# Patient Record
Sex: FEMALE | Race: WHITE | NOT HISPANIC OR LATINO | Employment: FULL TIME | URBAN - METROPOLITAN AREA
[De-identification: names, ages, dates, MRNs, and addresses within clinical notes are randomized per-mention and may not be internally consistent; named-entity substitution may affect disease eponyms.]

---

## 2023-03-06 ENCOUNTER — OFFICE VISIT (OUTPATIENT)
Dept: PRIMARY CARE | Facility: CLINIC | Age: 58
End: 2023-03-06
Payer: COMMERCIAL

## 2023-03-06 VITALS
RESPIRATION RATE: 16 BRPM | SYSTOLIC BLOOD PRESSURE: 122 MMHG | WEIGHT: 150 LBS | BODY MASS INDEX: 24.99 KG/M2 | DIASTOLIC BLOOD PRESSURE: 74 MMHG | HEART RATE: 76 BPM | TEMPERATURE: 98.4 F | HEIGHT: 65 IN

## 2023-03-06 DIAGNOSIS — R05.1 ACUTE COUGH: Primary | ICD-10-CM

## 2023-03-06 PROCEDURE — 4004F PT TOBACCO SCREEN RCVD TLK: CPT | Performed by: INTERNAL MEDICINE

## 2023-03-06 PROCEDURE — 99213 OFFICE O/P EST LOW 20 MIN: CPT | Performed by: INTERNAL MEDICINE

## 2023-03-06 RX ORDER — ALPRAZOLAM 0.25 MG/1
0.25 TABLET ORAL NIGHTLY PRN
COMMUNITY
End: 2023-06-16 | Stop reason: SDUPTHER

## 2023-03-06 RX ORDER — PREDNISONE 20 MG/1
TABLET ORAL
Qty: 21 TABLET | Refills: 0 | Status: SHIPPED | OUTPATIENT
Start: 2023-03-06 | End: 2023-08-02 | Stop reason: ALTCHOICE

## 2023-03-06 RX ORDER — CEFDINIR 300 MG/1
300 CAPSULE ORAL 2 TIMES DAILY
Qty: 20 CAPSULE | Refills: 0 | Status: SHIPPED | OUTPATIENT
Start: 2023-03-06 | End: 2023-03-16

## 2023-03-06 ASSESSMENT — ENCOUNTER SYMPTOMS
DIAPHORESIS: 0
DIZZINESS: 0
ARTHRALGIAS: 0
SORE THROAT: 0
APPETITE CHANGE: 0
EYE DISCHARGE: 0
SPEECH DIFFICULTY: 0
SINUS PRESSURE: 1
CHEST TIGHTNESS: 0
HEADACHES: 0
ABDOMINAL PAIN: 0
CHILLS: 0
DIARRHEA: 0
FREQUENCY: 0
JOINT SWELLING: 0
SINUS PAIN: 1
NAUSEA: 0
PALPITATIONS: 0
WEAKNESS: 0
FATIGUE: 0
FEVER: 0
CONSTIPATION: 0
SHORTNESS OF BREATH: 0
COUGH: 1
VOMITING: 0
WHEEZING: 0
DIFFICULTY URINATING: 0

## 2023-03-06 NOTE — PROGRESS NOTES
"Subjective   Kathe Amado is a 57 y.o. female who presents for Sinusitis, Cough, and URI (Patient states symptoms started 3 weeks ago. Cough, post drainage, fever last night 102.0 ).  Today she is accompanied by alone.     Sinusitis  Associated symptoms include coughing and sinus pressure. Pertinent negatives include no chills, congestion, diaphoresis, ear pain, headaches, shortness of breath or sore throat.   Cough  Pertinent negatives include no chest pain, chills, ear pain, fever, headaches, rash, sore throat, shortness of breath or wheezing.   URI   Associated symptoms include coughing and sinus pain. Pertinent negatives include no abdominal pain, chest pain, congestion, diarrhea, ear pain, headaches, nausea, rash, sore throat, vomiting or wheezing.     Woke up yesterday with a cough and congestion.  Fever of 100.2 and now better.    Review of Systems   Constitutional:  Negative for appetite change, chills, diaphoresis, fatigue and fever.   HENT:  Positive for sinus pressure and sinus pain. Negative for congestion, ear discharge, ear pain and sore throat.    Eyes:  Negative for discharge.   Respiratory:  Positive for cough. Negative for chest tightness, shortness of breath and wheezing.    Cardiovascular:  Negative for chest pain, palpitations and leg swelling.   Gastrointestinal:  Negative for abdominal pain, constipation, diarrhea, nausea and vomiting.   Endocrine: Negative for cold intolerance, heat intolerance and polyuria.   Genitourinary:  Negative for difficulty urinating and frequency.   Musculoskeletal:  Negative for arthralgias and joint swelling.   Skin:  Negative for rash.   Neurological:  Negative for dizziness, speech difficulty, weakness and headaches.        Objective   /74   Pulse 76   Temp 36.9 °C (98.4 °F)   Resp 16   Ht 1.651 m (5' 5\")   Wt 68 kg (150 lb)   BMI 24.96 kg/m²   BSA: 1.77 meters squared  Growth percentiles: Facility age limit for growth %dorina is 20 years. " Facility age limit for growth %dorina is 20 years.     Physical Exam  Constitutional:       General: She is not in acute distress.     Appearance: Normal appearance. She is normal weight. She is not toxic-appearing.   HENT:      Right Ear: Tympanic membrane, ear canal and external ear normal.      Left Ear: Tympanic membrane, ear canal and external ear normal.      Mouth/Throat:      Mouth: Mucous membranes are moist.      Pharynx: Oropharynx is clear. No oropharyngeal exudate or posterior oropharyngeal erythema.   Cardiovascular:      Rate and Rhythm: Bradycardia present. Rhythm irregular.   Pulmonary:      Effort: No respiratory distress.      Breath sounds: No wheezing or rales.   Musculoskeletal:      Cervical back: No rigidity or tenderness.   Lymphadenopathy:      Cervical: No cervical adenopathy.   Neurological:      Mental Status: She is alert.         Assessment/Plan   Problem List Items Addressed This Visit    None      Nando Olivas DO

## 2023-03-07 PROCEDURE — U0005 INFEC AGEN DETEC AMPLI PROBE: HCPCS

## 2023-03-07 PROCEDURE — 87635 SARS-COV-2 COVID-19 AMP PRB: CPT

## 2023-03-08 LAB — SARS-COV-2 RESULT: NOT DETECTED

## 2023-06-13 ENCOUNTER — TELEPHONE (OUTPATIENT)
Dept: PRIMARY CARE | Facility: CLINIC | Age: 58
End: 2023-06-13
Payer: COMMERCIAL

## 2023-06-16 DIAGNOSIS — F41.9 ANXIETY: ICD-10-CM

## 2023-06-16 RX ORDER — ALPRAZOLAM 0.25 MG/1
0.25 TABLET ORAL NIGHTLY PRN
Qty: 7 TABLET | Refills: 2 | Status: SHIPPED | OUTPATIENT
Start: 2023-06-16 | End: 2024-03-27 | Stop reason: SDUPTHER

## 2023-08-02 ENCOUNTER — OFFICE VISIT (OUTPATIENT)
Dept: PRIMARY CARE | Facility: CLINIC | Age: 58
End: 2023-08-02
Payer: COMMERCIAL

## 2023-08-02 VITALS
SYSTOLIC BLOOD PRESSURE: 130 MMHG | TEMPERATURE: 98.4 F | DIASTOLIC BLOOD PRESSURE: 68 MMHG | HEIGHT: 65 IN | BODY MASS INDEX: 24.03 KG/M2 | RESPIRATION RATE: 16 BRPM | WEIGHT: 144.2 LBS

## 2023-08-02 DIAGNOSIS — R35.0 URINE FREQUENCY: ICD-10-CM

## 2023-08-02 DIAGNOSIS — N30.00 ACUTE CYSTITIS WITHOUT HEMATURIA: Primary | ICD-10-CM

## 2023-08-02 DIAGNOSIS — Z00.00 WELL ADULT HEALTH CHECK: ICD-10-CM

## 2023-08-02 DIAGNOSIS — Z12.31 ENCOUNTER FOR SCREENING MAMMOGRAM FOR MALIGNANT NEOPLASM OF BREAST: ICD-10-CM

## 2023-08-02 DIAGNOSIS — F51.01 PRIMARY INSOMNIA: ICD-10-CM

## 2023-08-02 PROBLEM — F41.8 MIXED ANXIETY AND DEPRESSIVE DISORDER: Status: ACTIVE | Noted: 2023-08-02

## 2023-08-02 PROBLEM — E55.9 VITAMIN D INSUFFICIENCY: Status: ACTIVE | Noted: 2023-08-02

## 2023-08-02 PROBLEM — R00.2 PALPITATIONS: Status: ACTIVE | Noted: 2023-08-02

## 2023-08-02 PROBLEM — I49.3 PVC (PREMATURE VENTRICULAR CONTRACTION): Status: ACTIVE | Noted: 2023-08-02

## 2023-08-02 PROBLEM — M79.18 MYOFASCIAL PAIN SYNDROME: Status: ACTIVE | Noted: 2023-08-02

## 2023-08-02 PROBLEM — J02.9 PHARYNGITIS: Status: ACTIVE | Noted: 2023-08-02

## 2023-08-02 PROBLEM — K21.9 GERD (GASTROESOPHAGEAL REFLUX DISEASE): Status: ACTIVE | Noted: 2019-10-08

## 2023-08-02 PROBLEM — H90.3 BILATERAL SENSORINEURAL HEARING LOSS: Status: ACTIVE | Noted: 2023-08-02

## 2023-08-02 PROBLEM — E78.2 HYPERLIPIDEMIA, MIXED: Status: ACTIVE | Noted: 2023-08-02

## 2023-08-02 PROBLEM — U07.1 COVID-19 VIRUS INFECTION: Status: ACTIVE | Noted: 2023-08-02

## 2023-08-02 PROBLEM — E53.8 VITAMIN B12 DEFICIENCY: Status: ACTIVE | Noted: 2023-08-02

## 2023-08-02 PROCEDURE — 4004F PT TOBACCO SCREEN RCVD TLK: CPT | Performed by: INTERNAL MEDICINE

## 2023-08-02 PROCEDURE — 99214 OFFICE O/P EST MOD 30 MIN: CPT | Performed by: INTERNAL MEDICINE

## 2023-08-02 RX ORDER — TRAZODONE HYDROCHLORIDE 50 MG/1
50 TABLET ORAL NIGHTLY PRN
Qty: 30 TABLET | Refills: 2 | Status: SHIPPED | OUTPATIENT
Start: 2023-08-02 | End: 2024-03-27 | Stop reason: ALTCHOICE

## 2023-08-02 RX ORDER — CIPROFLOXACIN 250 MG/1
250 TABLET, FILM COATED ORAL 2 TIMES DAILY
Qty: 6 TABLET | Refills: 0 | Status: SHIPPED | OUTPATIENT
Start: 2023-08-02 | End: 2023-08-05

## 2023-08-02 ASSESSMENT — PAIN SCALES - GENERAL: PAINLEVEL: 4

## 2023-08-02 ASSESSMENT — ENCOUNTER SYMPTOMS
FLANK PAIN: 1
FREQUENCY: 1

## 2023-08-02 NOTE — PROGRESS NOTES
"Subjective   Kathe Amado is a 58 y.o. female who presents for UTI.      Patient states that she has been feeling very fatigue  Pain start in abdomen and has moved to upper thighs and lower back on the left.  Right lower quadrant pain on the right.  Feels like she has some relief from cramping and wants to get it checked.  Patient states that she has urine frequency and when she does go she will get a sign a relief for about 5 minutes and then the discomfort comes back     Has not felt good recently and feels tired a lot.  Getting dizzy as well but thinks her sinuses are acting up.  Feels light headed and has been busy seeing mom in rehab.    Does not sleep well.  Falls asleep and sleeps well until midnight.  Up every hour and does not feel rested.   Had been using Xanax for sleep.  Has tried Melatonin but gives her nightmare.    No naps regularly during the day.    Reading before bed helps a little.  Stopped watching TV before bed but will watch up to bedtime.      Plays games on the tablet and that helps.     UTI   This is a new problem. The current episode started 1 to 4 weeks ago. The problem occurs every urination. The problem has been gradually worsening. The quality of the pain is described as aching. The pain is at a severity of 1/10. There has been no fever. There is No history of pyelonephritis. Associated symptoms include flank pain, frequency and urgency. She has tried nothing for the symptoms.       Review of Systems   Genitourinary:  Positive for flank pain, frequency and urgency.   All other systems reviewed and are negative.      Objective   /68 (BP Location: Right arm)   Temp 36.9 °C (98.4 °F)   Resp 16   Ht 1.651 m (5' 5\")   Wt 65.4 kg (144 lb 3.2 oz)   BMI 24.00 kg/m²       Physical Exam  Constitutional:       Appearance: Normal appearance.   HENT:      Head: Normocephalic.   Pulmonary:      Effort: Pulmonary effort is normal.   Musculoskeletal:      Cervical back: Neck supple.    "   Right lower leg: No edema.      Left lower leg: No edema.   Skin:     General: Skin is warm and dry.   Psychiatric:         Mood and Affect: Mood normal.         Assessment/Plan   Problem List Items Addressed This Visit       Primary insomnia     Discussed treatment options available, reviewed risks and benefits of the medication prescribed, and all questions were answered.  Trazodone recommended to help with sleep         Relevant Medications    traZODone (Desyrel) 50 mg tablet    Other Relevant Orders    Follow Up In [facility] - PCP - Established    CBC (Completed)    Acute cystitis without hematuria - Primary     Recurrent problem.  Treat with Cipro x 3 days.         Relevant Orders    CBC (Completed)     Other Visit Diagnoses       Urine frequency        Relevant Medications    traZODone (Desyrel) 50 mg tablet    Other Relevant Orders    POCT UA (nonautomated) manually resulted    CBC (Completed)    Well adult health check        Relevant Orders    TSH with reflex to Free T4 if abnormal (Completed)    Comprehensive Metabolic Panel (Completed)    CBC (Completed)    BI mammo bilateral screening tomosynthesis          Encounter Diagnoses   Name Primary?    Urine frequency     Acute cystitis without hematuria Yes    Primary insomnia     Well adult health check      Nando Olivas, DO

## 2023-08-07 ENCOUNTER — TELEPHONE (OUTPATIENT)
Dept: PRIMARY CARE | Facility: CLINIC | Age: 58
End: 2023-08-07
Payer: COMMERCIAL

## 2023-08-07 NOTE — TELEPHONE ENCOUNTER
Patient called because she came in last week for a UTI. She said she finished the antibiotics she was prescribed and her symptoms are the exact same. She wasn't sure what to do from here.

## 2023-08-17 ENCOUNTER — LAB (OUTPATIENT)
Dept: LAB | Facility: LAB | Age: 58
End: 2023-08-17
Payer: COMMERCIAL

## 2023-08-17 ENCOUNTER — OFFICE VISIT (OUTPATIENT)
Dept: PRIMARY CARE | Facility: CLINIC | Age: 58
End: 2023-08-17
Payer: COMMERCIAL

## 2023-08-17 VITALS
WEIGHT: 145 LBS | SYSTOLIC BLOOD PRESSURE: 113 MMHG | BODY MASS INDEX: 24.16 KG/M2 | DIASTOLIC BLOOD PRESSURE: 76 MMHG | HEART RATE: 81 BPM | HEIGHT: 65 IN | RESPIRATION RATE: 16 BRPM | TEMPERATURE: 97.6 F

## 2023-08-17 DIAGNOSIS — R35.0 URINE FREQUENCY: ICD-10-CM

## 2023-08-17 DIAGNOSIS — F51.01 PRIMARY INSOMNIA: ICD-10-CM

## 2023-08-17 DIAGNOSIS — Z00.00 WELL ADULT HEALTH CHECK: ICD-10-CM

## 2023-08-17 DIAGNOSIS — R31.9 URINARY TRACT INFECTION WITH HEMATURIA, SITE UNSPECIFIED: ICD-10-CM

## 2023-08-17 DIAGNOSIS — N30.00 ACUTE CYSTITIS WITHOUT HEMATURIA: ICD-10-CM

## 2023-08-17 DIAGNOSIS — N39.0 URINARY TRACT INFECTION WITH HEMATURIA, SITE UNSPECIFIED: ICD-10-CM

## 2023-08-17 DIAGNOSIS — F51.01 PRIMARY INSOMNIA: Primary | ICD-10-CM

## 2023-08-17 DIAGNOSIS — F41.8 MIXED ANXIETY AND DEPRESSIVE DISORDER: ICD-10-CM

## 2023-08-17 LAB
ALANINE AMINOTRANSFERASE (SGPT) (U/L) IN SER/PLAS: 11 U/L (ref 7–45)
ALBUMIN (G/DL) IN SER/PLAS: 4.5 G/DL (ref 3.4–5)
ALKALINE PHOSPHATASE (U/L) IN SER/PLAS: 66 U/L (ref 33–110)
ANION GAP IN SER/PLAS: 14 MMOL/L (ref 10–20)
ASPARTATE AMINOTRANSFERASE (SGOT) (U/L) IN SER/PLAS: 13 U/L (ref 9–39)
BILIRUBIN TOTAL (MG/DL) IN SER/PLAS: 0.3 MG/DL (ref 0–1.2)
CALCIUM (MG/DL) IN SER/PLAS: 9.2 MG/DL (ref 8.6–10.3)
CARBON DIOXIDE, TOTAL (MMOL/L) IN SER/PLAS: 26 MMOL/L (ref 21–32)
CHLORIDE (MMOL/L) IN SER/PLAS: 104 MMOL/L (ref 98–107)
CREATININE (MG/DL) IN SER/PLAS: 0.76 MG/DL (ref 0.5–1.05)
ERYTHROCYTE DISTRIBUTION WIDTH (RATIO) BY AUTOMATED COUNT: 12.6 % (ref 11.5–14.5)
ERYTHROCYTE MEAN CORPUSCULAR HEMOGLOBIN CONCENTRATION (G/DL) BY AUTOMATED: 32.3 G/DL (ref 32–36)
ERYTHROCYTE MEAN CORPUSCULAR VOLUME (FL) BY AUTOMATED COUNT: 90 FL (ref 80–100)
ERYTHROCYTES (10*6/UL) IN BLOOD BY AUTOMATED COUNT: 4.73 X10E12/L (ref 4–5.2)
GFR FEMALE: >90 ML/MIN/1.73M2
GLUCOSE (MG/DL) IN SER/PLAS: 99 MG/DL (ref 74–99)
HEMATOCRIT (%) IN BLOOD BY AUTOMATED COUNT: 42.7 % (ref 36–46)
HEMOGLOBIN (G/DL) IN BLOOD: 13.8 G/DL (ref 12–16)
LEUKOCYTES (10*3/UL) IN BLOOD BY AUTOMATED COUNT: 9.7 X10E9/L (ref 4.4–11.3)
PLATELETS (10*3/UL) IN BLOOD AUTOMATED COUNT: 273 X10E9/L (ref 150–450)
POC APPEARANCE, URINE: ABNORMAL
POC BILIRUBIN, URINE: NEGATIVE
POC BLOOD, URINE: ABNORMAL
POC COLOR, URINE: YELLOW
POC GLUCOSE, URINE: NEGATIVE MG/DL
POC KETONES, URINE: NEGATIVE MG/DL
POC LEUKOCYTES, URINE: ABNORMAL
POC NITRITE,URINE: NEGATIVE
POC PH, URINE: 5 PH
POC PROTEIN, URINE: NEGATIVE MG/DL
POC SPECIFIC GRAVITY, URINE: 1.01
POC UROBILINOGEN, URINE: 0.2 EU/DL
POTASSIUM (MMOL/L) IN SER/PLAS: 4.2 MMOL/L (ref 3.5–5.3)
PROTEIN TOTAL: 6.6 G/DL (ref 6.4–8.2)
SODIUM (MMOL/L) IN SER/PLAS: 140 MMOL/L (ref 136–145)
THYROTROPIN (MIU/L) IN SER/PLAS BY DETECTION LIMIT <= 0.05 MIU/L: 1.84 MIU/L (ref 0.44–3.98)
UREA NITROGEN (MG/DL) IN SER/PLAS: 21 MG/DL (ref 6–23)

## 2023-08-17 PROCEDURE — 4004F PT TOBACCO SCREEN RCVD TLK: CPT | Performed by: INTERNAL MEDICINE

## 2023-08-17 PROCEDURE — 87086 URINE CULTURE/COLONY COUNT: CPT

## 2023-08-17 PROCEDURE — 84443 ASSAY THYROID STIM HORMONE: CPT

## 2023-08-17 PROCEDURE — 99213 OFFICE O/P EST LOW 20 MIN: CPT | Performed by: INTERNAL MEDICINE

## 2023-08-17 PROCEDURE — 80053 COMPREHEN METABOLIC PANEL: CPT

## 2023-08-17 PROCEDURE — 85027 COMPLETE CBC AUTOMATED: CPT

## 2023-08-17 PROCEDURE — 81002 URINALYSIS NONAUTO W/O SCOPE: CPT | Performed by: INTERNAL MEDICINE

## 2023-08-17 PROCEDURE — 36415 COLL VENOUS BLD VENIPUNCTURE: CPT

## 2023-08-17 RX ORDER — CIPROFLOXACIN 250 MG/1
250 TABLET, FILM COATED ORAL 2 TIMES DAILY
Qty: 14 TABLET | Refills: 0 | Status: SHIPPED | OUTPATIENT
Start: 2023-08-17 | End: 2023-08-24

## 2023-08-17 ASSESSMENT — ENCOUNTER SYMPTOMS
ABDOMINAL PAIN: 0
FEVER: 0
SHORTNESS OF BREATH: 0

## 2023-08-17 NOTE — PROGRESS NOTES
"Subjective   Kathe Amado is a 57 y.o. female who presents for UTI (New onset of burning ).      Still getting cramping lower admen and urinating more frequently.  Feels burning lately as well.     Sleeping better and taking it when she gets in bed..  Before that took it an hour before but felt anxious.  Feels better rested and does not feel groggy.         Review of Systems   Constitutional:  Negative for fever.   Respiratory:  Negative for shortness of breath.    Cardiovascular:  Negative for chest pain.   Gastrointestinal:  Negative for abdominal pain.   Skin:  Negative for rash.       Objective   /76   Pulse 81   Temp 36.4 °C (97.6 °F)   Resp 16   Ht 1.651 m (5' 5\")   Wt 65.8 kg (145 lb)   BMI 24.13 kg/m²       Physical Exam    Assessment/Plan   Problem List Items Addressed This Visit       Mixed anxiety and depressive disorder    Primary insomnia - Primary    Urinary tract infection with hematuria    Relevant Medications    ciprofloxacin (Cipro) 250 mg tablet    Other Relevant Orders    POCT UA (nonautomated) manually resulted (Completed)     Encounter Diagnoses   Name Primary?    Urinary tract infection with hematuria, site unspecified     Primary insomnia Yes    Mixed anxiety and depressive disorder      Nando Olivas, DO     "

## 2023-08-19 LAB — URINE CULTURE: NORMAL

## 2023-08-23 PROBLEM — N30.00 ACUTE CYSTITIS WITHOUT HEMATURIA: Status: ACTIVE | Noted: 2023-08-23

## 2023-08-23 NOTE — ASSESSMENT & PLAN NOTE
Discussed treatment options available, reviewed risks and benefits of the medication prescribed, and all questions were answered.  Trazodone recommended to help with sleep

## 2023-08-29 ENCOUNTER — OFFICE VISIT (OUTPATIENT)
Dept: PRIMARY CARE | Facility: CLINIC | Age: 58
End: 2023-08-29
Payer: COMMERCIAL

## 2023-08-29 VITALS
HEIGHT: 65 IN | WEIGHT: 144.4 LBS | HEART RATE: 75 BPM | TEMPERATURE: 98.2 F | BODY MASS INDEX: 24.06 KG/M2 | OXYGEN SATURATION: 95 % | SYSTOLIC BLOOD PRESSURE: 109 MMHG | RESPIRATION RATE: 16 BRPM | DIASTOLIC BLOOD PRESSURE: 72 MMHG

## 2023-08-29 DIAGNOSIS — R35.0 URINE FREQUENCY: ICD-10-CM

## 2023-08-29 DIAGNOSIS — R10.31 RLQ ABDOMINAL PAIN: Primary | ICD-10-CM

## 2023-08-29 LAB
POC APPEARANCE, URINE: CLEAR
POC BILIRUBIN, URINE: NEGATIVE
POC BLOOD, URINE: ABNORMAL
POC COLOR, URINE: YELLOW
POC GLUCOSE, URINE: NEGATIVE MG/DL
POC KETONES, URINE: NEGATIVE MG/DL
POC LEUKOCYTES, URINE: NEGATIVE
POC NITRITE,URINE: NEGATIVE
POC PH, URINE: 6 PH
POC PROTEIN, URINE: ABNORMAL MG/DL
POC SPECIFIC GRAVITY, URINE: >=1.03
POC UROBILINOGEN, URINE: 0.2 EU/DL

## 2023-08-29 PROCEDURE — 81002 URINALYSIS NONAUTO W/O SCOPE: CPT | Performed by: INTERNAL MEDICINE

## 2023-08-29 PROCEDURE — 99213 OFFICE O/P EST LOW 20 MIN: CPT | Performed by: INTERNAL MEDICINE

## 2023-08-29 PROCEDURE — 4004F PT TOBACCO SCREEN RCVD TLK: CPT | Performed by: INTERNAL MEDICINE

## 2023-08-29 ASSESSMENT — ENCOUNTER SYMPTOMS
FREQUENCY: 1
NAUSEA: 1
FLANK PAIN: 1
SWEATS: 1

## 2023-08-29 NOTE — PROGRESS NOTES
"Subjective   Kathe Amado is a 58 y.o. female who presents for UTI (/) and Follow-up.      Patient has taken 2 rounds of antibiotics and states that she is not feeling any better   Patient states that she is constantly is feeling like she has to urinate   Having pain/pressure lower right abdomen that wraps around to lower back- wakes her up at night   Patient states that her urine is still very yellow  No burning   Patient does state that she has been sweating at night but not sure if its due to the trazodone   Patient states that years ago during a scan they found that she had calcium build up on her kidneys but has not been checked since    Feels like she needs to go to the bathroom all the time.  Still with RLQ discomfort and it radiatess around to her back and needs to go to the bathroom a lot.     UTI   This is a recurrent problem. The current episode started 1 to 4 weeks ago. The problem has been unchanged. There has been no fever. Associated symptoms include flank pain, frequency, nausea and sweats. She has tried antibiotics for the symptoms. The treatment provided mild relief. Her past medical history is significant for recurrent UTIs.       Review of Systems   Gastrointestinal:  Positive for nausea.   Genitourinary:  Positive for flank pain and frequency.   All other systems reviewed and are negative.      Objective   /72   Pulse 75   Temp 36.8 °C (98.2 °F)   Resp 16   Ht 1.651 m (5' 5\")   Wt 65.5 kg (144 lb 6.4 oz)   SpO2 95%   BMI 24.03 kg/m²       Physical Exam  Constitutional:       Appearance: Normal appearance.   HENT:      Head: Normocephalic.   Eyes:      Conjunctiva/sclera: Conjunctivae normal.   Cardiovascular:      Rate and Rhythm: Normal rate and regular rhythm.   Pulmonary:      Effort: Pulmonary effort is normal.      Breath sounds: Normal breath sounds.   Abdominal:      General: Abdomen is flat.      Palpations: Abdomen is soft.      Tenderness: There is abdominal " tenderness in the right lower quadrant. Positive signs include psoas sign. Negative signs include obturator sign.      Comments: RLQ pain without rebound or guarding but persistent and without resolution.     Musculoskeletal:      Cervical back: Neck supple.   Skin:     General: Skin is warm and dry.   Neurological:      Mental Status: She is alert.         Assessment/Plan   Problem List Items Addressed This Visit       RLQ abdominal pain - Primary     RLQ pain without rebound or guarding but persistent and without resolution.  Will check CT abdomen and pelvis to rule out chronic appendicitis.          Relevant Orders    CT abdomen pelvis wo IV contrast     Other Visit Diagnoses       Urine frequency        Relevant Orders    POCT UA (nonautomated) manually resulted (Completed)          Encounter Diagnoses   Name Primary?    Urine frequency     RLQ abdominal pain Yes     Nando Olivas, DO

## 2023-08-29 NOTE — ASSESSMENT & PLAN NOTE
RLQ pain without rebound or guarding but persistent and without resolution.  Will check CT abdomen and pelvis to rule out chronic appendicitis.

## 2023-09-05 ENCOUNTER — APPOINTMENT (OUTPATIENT)
Dept: PRIMARY CARE | Facility: CLINIC | Age: 58
End: 2023-09-05
Payer: COMMERCIAL

## 2023-11-27 ENCOUNTER — OFFICE VISIT (OUTPATIENT)
Dept: PRIMARY CARE | Facility: CLINIC | Age: 58
End: 2023-11-27
Payer: COMMERCIAL

## 2023-11-27 VITALS
HEART RATE: 84 BPM | RESPIRATION RATE: 16 BRPM | WEIGHT: 144 LBS | TEMPERATURE: 98.6 F | SYSTOLIC BLOOD PRESSURE: 124 MMHG | BODY MASS INDEX: 23.99 KG/M2 | HEIGHT: 65 IN | OXYGEN SATURATION: 95 % | DIASTOLIC BLOOD PRESSURE: 72 MMHG

## 2023-11-27 DIAGNOSIS — J01.90 SUBACUTE SINUSITIS, UNSPECIFIED LOCATION: Primary | ICD-10-CM

## 2023-11-27 DIAGNOSIS — F17.210 CIGARETTE NICOTINE DEPENDENCE WITHOUT COMPLICATION: ICD-10-CM

## 2023-11-27 PROCEDURE — 4004F PT TOBACCO SCREEN RCVD TLK: CPT | Performed by: INTERNAL MEDICINE

## 2023-11-27 PROCEDURE — 99214 OFFICE O/P EST MOD 30 MIN: CPT | Performed by: INTERNAL MEDICINE

## 2023-11-27 RX ORDER — IBUPROFEN 200 MG
1 TABLET ORAL EVERY 24 HOURS
Qty: 30 PATCH | Refills: 0 | Status: SHIPPED | OUTPATIENT
Start: 2023-11-27 | End: 2024-03-27 | Stop reason: ALTCHOICE

## 2023-11-27 RX ORDER — CEFDINIR 300 MG/1
300 CAPSULE ORAL 2 TIMES DAILY
Qty: 28 CAPSULE | Refills: 0 | Status: SHIPPED | OUTPATIENT
Start: 2023-11-27 | End: 2023-12-11

## 2023-11-27 ASSESSMENT — ENCOUNTER SYMPTOMS
NECK PAIN: 1
SINUS PRESSURE: 1
COUGH: 1
HEADACHES: 1

## 2023-11-27 ASSESSMENT — PAIN SCALES - GENERAL: PAINLEVEL: 8

## 2023-11-27 NOTE — PROGRESS NOTES
"Subjective   Kathe Amado is a 58 y.o. female who presents for Sinusitis.      Patient had went to urgent care back in September and was Dx with a sinus infection, was given antibiotics and jameson went away, the last 2 weeks patient state it has come back.  Was on Cefdinir October 8th through the 18th.    Left ear pressure and down left side of neck tender   Patient has some congestion, dry cough, fatigue, (but sleeping a lot), post nasal drainage making patient nauseous ( Patient states it feels the same as last month but on the left side instead of right side)  Patient has been taking Advil, sudafed and night quill to help her sleep and using humidifier at night     Has been using azelastine which helps and a sudafed if needed.  Took NyQuil the last 3 nights.      Sinusitis  This is a recurrent problem. The current episode started more than 1 month ago. The problem has been gradually worsening since onset. There has been no fever. Associated symptoms include congestion, coughing, ear pain, headaches, neck pain, sinus pressure and sneezing. Past treatments include antibiotics and oral decongestants. The treatment provided mild relief.       Review of Systems   HENT:  Positive for congestion, ear pain, sinus pressure and sneezing.    Respiratory:  Positive for cough.    Musculoskeletal:  Positive for neck pain.   Neurological:  Positive for headaches.   All other systems reviewed and are negative.      Objective   /72   Pulse 84   Temp 37 °C (98.6 °F)   Resp 16   Ht 1.651 m (5' 5\")   Wt 65.3 kg (144 lb)   SpO2 95%   BMI 23.96 kg/m²       Physical Exam  Constitutional:       Appearance: Normal appearance.   HENT:      Head: Normocephalic.   Eyes:      Conjunctiva/sclera: Conjunctivae normal.   Cardiovascular:      Rate and Rhythm: Normal rate and regular rhythm.   Pulmonary:      Effort: Pulmonary effort is normal.      Breath sounds: Normal breath sounds.   Musculoskeletal:      Cervical back: Neck " supple.   Skin:     General: Skin is warm and dry.   Neurological:      Mental Status: She is alert.         Assessment/Plan   Problem List Items Addressed This Visit    None  Visit Diagnoses       Subacute sinusitis, unspecified location    -  Primary    Relevant Medications    cefdinir (Omnicef) 300 mg capsule    Other Relevant Orders    Referral to ENT    Cigarette nicotine dependence without complication        Relevant Medications    nicotine (Nicoderm CQ) 14 mg/24 hr patch    Other Relevant Orders    CT lung screening low dose          Encounter Diagnoses   Name Primary?    Subacute sinusitis, unspecified location Yes    Cigarette nicotine dependence without complication      Nando Olivas, DO

## 2023-12-11 ENCOUNTER — ANCILLARY PROCEDURE (OUTPATIENT)
Dept: RADIOLOGY | Facility: CLINIC | Age: 58
End: 2023-12-11
Payer: COMMERCIAL

## 2023-12-11 DIAGNOSIS — F17.210 CIGARETTE NICOTINE DEPENDENCE WITHOUT COMPLICATION: ICD-10-CM

## 2023-12-11 PROCEDURE — 71271 CT THORAX LUNG CANCER SCR C-: CPT

## 2023-12-11 PROCEDURE — 71271 CT THORAX LUNG CANCER SCR C-: CPT | Performed by: RADIOLOGY

## 2024-03-27 ENCOUNTER — OFFICE VISIT (OUTPATIENT)
Dept: PRIMARY CARE | Facility: CLINIC | Age: 59
End: 2024-03-27
Payer: COMMERCIAL

## 2024-03-27 VITALS
RESPIRATION RATE: 16 BRPM | TEMPERATURE: 97.6 F | WEIGHT: 149.8 LBS | HEART RATE: 70 BPM | BODY MASS INDEX: 24.96 KG/M2 | DIASTOLIC BLOOD PRESSURE: 70 MMHG | SYSTOLIC BLOOD PRESSURE: 120 MMHG | HEIGHT: 65 IN | OXYGEN SATURATION: 95 %

## 2024-03-27 DIAGNOSIS — K21.00 GASTROESOPHAGEAL REFLUX DISEASE WITH ESOPHAGITIS WITHOUT HEMORRHAGE: ICD-10-CM

## 2024-03-27 DIAGNOSIS — E78.2 HYPERLIPIDEMIA, MIXED: ICD-10-CM

## 2024-03-27 DIAGNOSIS — Z00.00 WELL ADULT EXAM: Primary | ICD-10-CM

## 2024-03-27 DIAGNOSIS — F41.8 MIXED ANXIETY AND DEPRESSIVE DISORDER: ICD-10-CM

## 2024-03-27 DIAGNOSIS — F41.9 ANXIETY: ICD-10-CM

## 2024-03-27 PROBLEM — R00.2 PALPITATIONS: Status: RESOLVED | Noted: 2023-08-02 | Resolved: 2024-03-27

## 2024-03-27 PROBLEM — M79.18 MYOFASCIAL PAIN SYNDROME: Status: RESOLVED | Noted: 2023-08-02 | Resolved: 2024-03-27

## 2024-03-27 PROBLEM — R10.31 RLQ ABDOMINAL PAIN: Status: RESOLVED | Noted: 2023-08-29 | Resolved: 2024-03-27

## 2024-03-27 PROBLEM — N39.0 URINARY TRACT INFECTION WITH HEMATURIA: Status: RESOLVED | Noted: 2023-08-17 | Resolved: 2024-03-27

## 2024-03-27 PROBLEM — R31.9 URINARY TRACT INFECTION WITH HEMATURIA: Status: RESOLVED | Noted: 2023-08-17 | Resolved: 2024-03-27

## 2024-03-27 PROBLEM — U07.1 COVID-19 VIRUS INFECTION: Status: RESOLVED | Noted: 2023-08-02 | Resolved: 2024-03-27

## 2024-03-27 PROBLEM — J02.9 PHARYNGITIS: Status: RESOLVED | Noted: 2023-08-02 | Resolved: 2024-03-27

## 2024-03-27 PROBLEM — N30.00 ACUTE CYSTITIS WITHOUT HEMATURIA: Status: RESOLVED | Noted: 2023-08-23 | Resolved: 2024-03-27

## 2024-03-27 PROBLEM — F51.01 PRIMARY INSOMNIA: Status: RESOLVED | Noted: 2023-08-17 | Resolved: 2024-03-27

## 2024-03-27 PROCEDURE — 99396 PREV VISIT EST AGE 40-64: CPT | Performed by: INTERNAL MEDICINE

## 2024-03-27 PROCEDURE — 4004F PT TOBACCO SCREEN RCVD TLK: CPT | Performed by: INTERNAL MEDICINE

## 2024-03-27 RX ORDER — FAMOTIDINE 40 MG/1
40 TABLET, FILM COATED ORAL 2 TIMES DAILY
Qty: 60 TABLET | Refills: 5 | Status: SHIPPED | OUTPATIENT
Start: 2024-03-27 | End: 2024-03-28 | Stop reason: SDUPTHER

## 2024-03-27 RX ORDER — ALPRAZOLAM 0.25 MG/1
0.25 TABLET ORAL NIGHTLY PRN
Qty: 30 TABLET | Refills: 3 | Status: SHIPPED | OUTPATIENT
Start: 2024-03-27 | End: 2024-07-25

## 2024-03-27 RX ORDER — AZELASTINE 1 MG/ML
2 SPRAY, METERED NASAL 2 TIMES DAILY
COMMUNITY
Start: 2023-11-05

## 2024-03-27 ASSESSMENT — PROMIS GLOBAL HEALTH SCALE
RATE_GENERAL_HEALTH: VERY GOOD
RATE_MENTAL_HEALTH: GOOD
RATE_PHYSICAL_HEALTH: VERY GOOD
CARRYOUT_PHYSICAL_ACTIVITIES: COMPLETELY
RATE_SOCIAL_SATISFACTION: GOOD
EMOTIONAL_PROBLEMS: SOMETIMES
RATE_AVERAGE_FATIGUE: MILD
CARRYOUT_SOCIAL_ACTIVITIES: VERY GOOD
RATE_AVERAGE_PAIN: 1
RATE_QUALITY_OF_LIFE: GOOD

## 2024-03-27 ASSESSMENT — PAIN SCALES - GENERAL: PAINLEVEL: 0-NO PAIN

## 2024-03-27 NOTE — PROGRESS NOTES
"Subjective   Kathe Amado is a 58 y.o. female who presents for Annual Exam.  Patient is still smoking, smoking little less then one pack a day     Mammogram: 2023  Colonoscopy: 2021    Well Adult Physical   Patient here for a comprehensive physical exam.The patient reports  problems, acid reflex- patient had an EDG done yesterday 3.26.24 and still waiting for results     Do you take any herbs or supplements that were not prescribed by a doctor? no   Are you taking calcium supplements? no   Are you taking aspirin daily? no     History:  LMP: No LMP recorded. Patient is postmenopausal.  Menopause at 47 years  Last pap date:   Abnormal pap? yes  : 4  Para: 4    Anxiety has returned and she is struggling with 8 years of stress and sadness.  The loss of her son and her daughters issues with health, headaches, and now her mom has declining mental health. Has trouble from time to time sleeping and xanax helped intermittently.          Review of Systems   All other systems reviewed and are negative.      Objective   /70   Pulse 70   Temp 36.4 °C (97.6 °F)   Resp 16   Ht 1.651 m (5' 5\")   Wt 67.9 kg (149 lb 12.8 oz)   SpO2 95%   BMI 24.93 kg/m²       Physical Exam  Constitutional:       Appearance: Normal appearance.   HENT:      Head: Normocephalic.   Eyes:      Conjunctiva/sclera: Conjunctivae normal.   Cardiovascular:      Rate and Rhythm: Normal rate and regular rhythm.      Heart sounds: Normal heart sounds.   Pulmonary:      Effort: Pulmonary effort is normal.      Breath sounds: Normal breath sounds.   Musculoskeletal:      Cervical back: Neck supple.   Skin:     General: Skin is warm and dry.   Neurological:      Mental Status: She is alert.         Assessment/Plan   Problem List Items Addressed This Visit       GERD (gastroesophageal reflux disease)    Hyperlipidemia, mixed    Mixed anxiety and depressive disorder     Other Visit Diagnoses       Well adult exam    -  Primary "    Relevant Medications    famotidine (Pepcid) 40 mg tablet    Other Relevant Orders    CT cardiac scoring wo IV contrast    Lipid Panel    TSH with reflex to Free T4 if abnormal    Comprehensive Metabolic Panel    CBC    Hepatitis C antibody    Anxiety        Relevant Medications    ALPRAZolam (Xanax) 0.25 mg tablet          Encounter Diagnoses   Name Primary?    Well adult exam Yes    Anxiety     Mixed anxiety and depressive disorder     Gastroesophageal reflux disease with esophagitis without hemorrhage     Hyperlipidemia, mixed      Nando Olivas, DO

## 2024-03-28 DIAGNOSIS — Z00.00 WELL ADULT EXAM: ICD-10-CM

## 2024-03-28 RX ORDER — FAMOTIDINE 40 MG/1
40 TABLET, FILM COATED ORAL 2 TIMES DAILY
Qty: 180 TABLET | Refills: 3 | Status: SHIPPED | OUTPATIENT
Start: 2024-03-28 | End: 2024-04-29

## 2024-04-26 ENCOUNTER — HOSPITAL ENCOUNTER (OUTPATIENT)
Dept: RADIOLOGY | Facility: HOSPITAL | Age: 59
Discharge: HOME | End: 2024-04-26
Payer: COMMERCIAL

## 2024-04-26 DIAGNOSIS — Z00.00 WELL ADULT EXAM: ICD-10-CM

## 2024-04-26 PROCEDURE — 75571 CT HRT W/O DYE W/CA TEST: CPT

## 2024-11-07 NOTE — PROGRESS NOTES
"Subjective   Kathe Amado is a 57 y.o. female who presents for Sinusitis, Cough, and URI (Patient states symptoms started 3 weeks ago. Cough, post drainage, fever last night 102.0 ).  Today she is accompanied by alone.     Sinusitis  Associated symptoms include coughing.   Cough    URI   Associated symptoms include coughing.       Review of Systems   Respiratory:  Positive for cough.        Objective   /74   Pulse 76   Temp 36.9 °C (98.4 °F)   Resp 16   Ht 1.651 m (5' 5\")   Wt 68 kg (150 lb)   BMI 24.96 kg/m²   BSA: 1.77 meters squared  Growth percentiles: Facility age limit for growth %dorina is 20 years. Facility age limit for growth %dorina is 20 years.     Physical Exam  Constitutional:       General: She is not in acute distress.     Appearance: She is not toxic-appearing.   HENT:      Right Ear: Tympanic membrane, ear canal and external ear normal. There is no impacted cerumen.      Left Ear: Tympanic membrane, ear canal and external ear normal. There is no impacted cerumen.      Nose: Nose normal.      Mouth/Throat:      Mouth: Mucous membranes are dry.      Pharynx: Oropharynx is clear.   Eyes:      Extraocular Movements: Extraocular movements intact.      Conjunctiva/sclera: Conjunctivae normal.      Pupils: Pupils are equal, round, and reactive to light.   Cardiovascular:      Rate and Rhythm: Normal rate and regular rhythm.      Pulses: Normal pulses.      Heart sounds: Normal heart sounds.   Pulmonary:      Effort: Pulmonary effort is normal.      Breath sounds: Normal breath sounds.   Abdominal:      General: Abdomen is flat.   Musculoskeletal:      Cervical back: Normal range of motion and neck supple.      Right lower leg: No edema.      Left lower leg: No edema.   Skin:     General: Skin is warm and dry.   Neurological:      Mental Status: She is alert.   Psychiatric:         Mood and Affect: Mood normal.         Behavior: Behavior normal.         Assessment/Plan   Problem List Items " Patient on the way to xray    Addressed This Visit    None  Visit Diagnoses       Acute cough    -  Primary    Relevant Medications    cefdinir (Omnicef) 300 mg capsule    predniSONE (Deltasone) 20 mg tablet    dextromethorphan-guaifenesin (Mucinex DM)  mg 12 hr tablet    Other Relevant Orders    Sars-CoV-2, Influenza A/B and RSV PCR, Symptomatic            Nando Olivas DO

## 2024-11-12 ENCOUNTER — TELEPHONE (OUTPATIENT)
Dept: PRIMARY CARE | Facility: CLINIC | Age: 59
End: 2024-11-12
Payer: COMMERCIAL

## 2024-11-12 NOTE — TELEPHONE ENCOUNTER
Returned a voicemail from the patient requesting an appointment for this week.  No specific reason was given in the voicemail.    When asked patient stated the appointment was for a medication without specifics.    The patient was informed Dr. Olivas did not have any openings this week and his next available would be December 3rd.    Patient stated she would be out of town.  An appointment was offered with ANDREW Tan tomorrow but the patient declined and then said she did not need an appointment.

## 2024-11-14 ENCOUNTER — OFFICE VISIT (OUTPATIENT)
Dept: PRIMARY CARE | Facility: CLINIC | Age: 59
End: 2024-11-14
Payer: COMMERCIAL

## 2024-11-14 VITALS
HEIGHT: 65 IN | WEIGHT: 151 LBS | RESPIRATION RATE: 16 BRPM | OXYGEN SATURATION: 94 % | HEART RATE: 81 BPM | BODY MASS INDEX: 25.16 KG/M2 | SYSTOLIC BLOOD PRESSURE: 119 MMHG | DIASTOLIC BLOOD PRESSURE: 78 MMHG | TEMPERATURE: 98.2 F

## 2024-11-14 DIAGNOSIS — F41.8 MIXED ANXIETY AND DEPRESSIVE DISORDER: Primary | ICD-10-CM

## 2024-11-14 DIAGNOSIS — S16.1XXA STRAIN OF NECK MUSCLE, INITIAL ENCOUNTER: ICD-10-CM

## 2024-11-14 PROCEDURE — 99214 OFFICE O/P EST MOD 30 MIN: CPT | Performed by: INTERNAL MEDICINE

## 2024-11-14 PROCEDURE — 3008F BODY MASS INDEX DOCD: CPT | Performed by: INTERNAL MEDICINE

## 2024-11-14 RX ORDER — CITALOPRAM 10 MG/1
10 TABLET ORAL DAILY
Qty: 30 TABLET | Refills: 1 | Status: SHIPPED | OUTPATIENT
Start: 2024-11-14 | End: 2025-01-13

## 2024-11-14 RX ORDER — IBUPROFEN 200 MG
600 TABLET ORAL EVERY 8 HOURS PRN
Qty: 60 TABLET | Refills: 1 | Status: SHIPPED | OUTPATIENT
Start: 2024-11-14

## 2024-11-14 ASSESSMENT — ENCOUNTER SYMPTOMS
NECK PAIN: 1
NECK STIFFNESS: 1

## 2024-11-14 NOTE — PROGRESS NOTES
"Subjective   Kathe Amado is a 59 y.o. female who presents for Neck Pain, Shoulder Pain, and Anxiety.      Patient presents for neck and Shoulder pain. She has had this before, but it hasn't lasted 3 weeks.  Had deep tissue massage and it continued and did not improve.  She went to her Chiropractor, did neck exercise but nothing is making it go away.   Working at a new job and at a desk all day.  Feels like tension and it is starting to make her crazy.    If it gets really bad she takes Ibuprofen which will take the edge off.  Takes 2 and it is not helping very much.  Possible puffy are on her back.    She is more concerned now because its going all the way down her back and partially on her arms.    Would like to discuss anxiety issues.  Has been very anxious lately due to to Yogesh's passing, and it has been a rough year.          Review of Systems   Musculoskeletal:  Positive for neck pain and neck stiffness.   All other systems reviewed and are negative.      Objective   /78 (BP Location: Left arm, Patient Position: Sitting, BP Cuff Size: Small adult)   Pulse 81   Temp 36.8 °C (98.2 °F) (Temporal)   Resp 16   Ht 1.651 m (5' 5\")   Wt 68.5 kg (151 lb)   SpO2 94%   BMI 25.13 kg/m²       Physical Exam  Constitutional:       Appearance: Normal appearance.   HENT:      Head: Normocephalic.   Pulmonary:      Effort: Pulmonary effort is normal.   Musculoskeletal:      Cervical back: Neck supple.   Skin:     General: Skin is warm and dry.   Psychiatric:         Mood and Affect: Mood normal.         Assessment & Plan  Mixed anxiety and depressive disorder  Alize had a long discussion regarding her ongoing stress with a new position at work, Her mothers recent passing, and health issues within the family.  Recently her  Markel has had heart issues that are currently being evaluated without resolution.   Her daughter often has severe anxiety issues which manifest as gastrointestinal as well as " neurologic complaints and often goes tot he ER when this is at its worst.    In addition, she admits she has never fully mourned the loss of her son and she tried to be strong for her family.  This ongoing pattern recurs on the anniversary of Yogesh's death.    In the past, she has resisted medication however her sister is taking and tolerating citalopram and she is willing to take this at this time.  Discussed treatment options available, reviewed risks and benefits of the medication prescribed, and all questions were answered.  Discussed treatment options including medication, therapy or both.   Discussed risks and benefits of SSRI medications  including increased risk of suicide, weight gain, lowered seizure threshold. Reviewed how these medications are taken, duration, delay in response and side effects including Nausea, Vomiting, Diarrhea, Headache, dizziness, change in appetite, sexual side effects, as well as reviewed serotonin syndrome, especially in higher doses or with multiple serotonin agents.     Orders:    citalopram (CeleXA) 10 mg tablet; Take 1 tablet (10 mg) by mouth once daily.    Strain of neck muscle, initial encounter    Orders:    ibuprofen 200 mg tablet; Take 3 tablets (600 mg) by mouth every 8 hours if needed for mild pain (1 - 3) or moderate pain (4 - 6).      Nando Olivas,

## 2024-11-14 NOTE — ASSESSMENT & PLAN NOTE
Candida and I had a long discussion regarding her ongoing stress with a new position at work, Her mothers recent passing, and health issues within the family.  Recently her  Markel has had heart issues that are currently being evaluated without resolution.   Her daughter often has severe anxiety issues which manifest as gastrointestinal as well as neurologic complaints and often goes tot he ER when this is at its worst.    In addition, she admits she has never fully mourned the loss of her son and she tried to be strong for her family.  This ongoing pattern recurs on the anniversary of Yogesh's death.    In the past, she has resisted medication however her sister is taking and tolerating citalopram and she is willing to take this at this time.  Discussed treatment options available, reviewed risks and benefits of the medication prescribed, and all questions were answered.  Discussed treatment options including medication, therapy or both.   Discussed risks and benefits of SSRI medications  including increased risk of suicide, weight gain, lowered seizure threshold. Reviewed how these medications are taken, duration, delay in response and side effects including Nausea, Vomiting, Diarrhea, Headache, dizziness, change in appetite, sexual side effects, as well as reviewed serotonin syndrome, especially in higher doses or with multiple serotonin agents.     Orders:    citalopram (CeleXA) 10 mg tablet; Take 1 tablet (10 mg) by mouth once daily.

## 2024-12-16 ENCOUNTER — APPOINTMENT (OUTPATIENT)
Dept: PRIMARY CARE | Facility: CLINIC | Age: 59
End: 2024-12-16
Payer: COMMERCIAL

## 2024-12-16 VITALS
OXYGEN SATURATION: 95 % | HEART RATE: 71 BPM | WEIGHT: 153.2 LBS | HEIGHT: 65 IN | TEMPERATURE: 98.2 F | DIASTOLIC BLOOD PRESSURE: 62 MMHG | RESPIRATION RATE: 16 BRPM | BODY MASS INDEX: 25.52 KG/M2 | SYSTOLIC BLOOD PRESSURE: 112 MMHG

## 2024-12-16 DIAGNOSIS — F41.8 MIXED ANXIETY AND DEPRESSIVE DISORDER: ICD-10-CM

## 2024-12-16 PROCEDURE — 99212 OFFICE O/P EST SF 10 MIN: CPT | Performed by: INTERNAL MEDICINE

## 2024-12-16 PROCEDURE — 3008F BODY MASS INDEX DOCD: CPT | Performed by: INTERNAL MEDICINE

## 2024-12-16 PROCEDURE — 4004F PT TOBACCO SCREEN RCVD TLK: CPT | Performed by: INTERNAL MEDICINE

## 2024-12-16 RX ORDER — CITALOPRAM 10 MG/1
20 TABLET ORAL DAILY
Qty: 60 TABLET | Refills: 11 | Status: SHIPPED | OUTPATIENT
Start: 2024-12-16 | End: 2025-12-16

## 2024-12-16 ASSESSMENT — PAIN SCALES - GENERAL: PAINLEVEL_OUTOF10: 0-NO PAIN

## 2024-12-16 NOTE — PROGRESS NOTES
"Subjective   Kathe Amado is a 59 y.o. female who presents for Follow-up (Neck,shoulder pain and anxiety ).      Patient states that the citalopram has been working well with her anxiety   Has not noticed any side effects other then it was making her feel tired so started taking it night which has helped   Neck and shoulder pain has improved, still has on the left side occasionally but thinks its related to her job and sitting at a desk, so tried to get up every 30 minutes to stretch    Sleeping well at first and then noted some         Review of Systems    Objective   /62   Pulse 71   Temp 36.8 °C (98.2 °F)   Resp 16   Ht 1.651 m (5' 5\")   Wt 69.5 kg (153 lb 3.2 oz)   SpO2 95%   BMI 25.49 kg/m²       Physical Exam  Constitutional:       Appearance: Normal appearance.   HENT:      Head: Normocephalic.   Eyes:      Conjunctiva/sclera: Conjunctivae normal.   Cardiovascular:      Rate and Rhythm: Normal rate and regular rhythm.      Heart sounds: Normal heart sounds.   Pulmonary:      Effort: Pulmonary effort is normal.      Breath sounds: Normal breath sounds.   Musculoskeletal:      Cervical back: Neck supple.   Skin:     General: Skin is warm and dry.   Neurological:      Mental Status: She is alert.         Assessment & Plan  Mixed anxiety and depressive disorder    Orders:    citalopram (CeleXA) 10 mg tablet; Take 2 tablets (20 mg) by mouth once daily.    Follow up in 2 months or sooner should symptoms worsen.         Nando Olivas, DO   "

## 2024-12-17 ENCOUNTER — HOSPITAL ENCOUNTER (OUTPATIENT)
Dept: RADIOLOGY | Facility: CLINIC | Age: 59
Discharge: HOME | End: 2024-12-17
Payer: COMMERCIAL

## 2024-12-17 DIAGNOSIS — Z87.891 PERSONAL HISTORY OF NICOTINE DEPENDENCE: ICD-10-CM

## 2024-12-17 PROCEDURE — 71271 CT THORAX LUNG CANCER SCR C-: CPT

## 2024-12-17 PROCEDURE — 71271 CT THORAX LUNG CANCER SCR C-: CPT | Performed by: RADIOLOGY

## 2025-01-21 ENCOUNTER — TELEPHONE (OUTPATIENT)
Dept: PRIMARY CARE | Facility: CLINIC | Age: 60
End: 2025-01-21
Payer: COMMERCIAL

## 2025-01-21 ENCOUNTER — APPOINTMENT (OUTPATIENT)
Dept: RADIOLOGY | Facility: HOSPITAL | Age: 60
End: 2025-01-21
Payer: COMMERCIAL

## 2025-01-21 ENCOUNTER — HOSPITAL ENCOUNTER (EMERGENCY)
Facility: HOSPITAL | Age: 60
Discharge: HOME | End: 2025-01-21
Attending: EMERGENCY MEDICINE
Payer: COMMERCIAL

## 2025-01-21 VITALS
WEIGHT: 145 LBS | HEIGHT: 65 IN | RESPIRATION RATE: 15 BRPM | BODY MASS INDEX: 24.16 KG/M2 | OXYGEN SATURATION: 99 % | SYSTOLIC BLOOD PRESSURE: 123 MMHG | DIASTOLIC BLOOD PRESSURE: 67 MMHG | TEMPERATURE: 97.7 F | HEART RATE: 86 BPM

## 2025-01-21 DIAGNOSIS — S09.90XA CLOSED HEAD INJURY, INITIAL ENCOUNTER: Primary | ICD-10-CM

## 2025-01-21 DIAGNOSIS — S06.0X0A CONCUSSION WITHOUT LOSS OF CONSCIOUSNESS, INITIAL ENCOUNTER: ICD-10-CM

## 2025-01-21 PROCEDURE — 99284 EMERGENCY DEPT VISIT MOD MDM: CPT | Mod: 25 | Performed by: EMERGENCY MEDICINE

## 2025-01-21 PROCEDURE — 70450 CT HEAD/BRAIN W/O DYE: CPT | Performed by: RADIOLOGY

## 2025-01-21 PROCEDURE — 76377 3D RENDER W/INTRP POSTPROCES: CPT

## 2025-01-21 PROCEDURE — 2500000001 HC RX 250 WO HCPCS SELF ADMINISTERED DRUGS (ALT 637 FOR MEDICARE OP)

## 2025-01-21 PROCEDURE — 70450 CT HEAD/BRAIN W/O DYE: CPT

## 2025-01-21 PROCEDURE — 70486 CT MAXILLOFACIAL W/O DYE: CPT

## 2025-01-21 RX ORDER — ONDANSETRON 4 MG/1
4 TABLET, ORALLY DISINTEGRATING ORAL ONCE
Status: DISCONTINUED | OUTPATIENT
Start: 2025-01-21 | End: 2025-01-21 | Stop reason: HOSPADM

## 2025-01-21 RX ORDER — ACETAMINOPHEN 325 MG/1
650 TABLET ORAL ONCE
Status: COMPLETED | OUTPATIENT
Start: 2025-01-21 | End: 2025-01-21

## 2025-01-21 RX ORDER — KETOROLAC TROMETHAMINE 15 MG/ML
15 INJECTION, SOLUTION INTRAMUSCULAR; INTRAVENOUS ONCE
Status: DISCONTINUED | OUTPATIENT
Start: 2025-01-21 | End: 2025-01-21 | Stop reason: HOSPADM

## 2025-01-21 RX ADMIN — ACETAMINOPHEN 650 MG: 325 TABLET ORAL at 13:03

## 2025-01-21 ASSESSMENT — PAIN - FUNCTIONAL ASSESSMENT: PAIN_FUNCTIONAL_ASSESSMENT: 0-10

## 2025-01-21 ASSESSMENT — PAIN SCALES - GENERAL
PAINLEVEL_OUTOF10: 5 - MODERATE PAIN
PAINLEVEL_OUTOF10: 3

## 2025-01-21 ASSESSMENT — LIFESTYLE VARIABLES
TOTAL SCORE: 0
EVER HAD A DRINK FIRST THING IN THE MORNING TO STEADY YOUR NERVES TO GET RID OF A HANGOVER: NO
HAVE YOU EVER FELT YOU SHOULD CUT DOWN ON YOUR DRINKING: NO
HAVE PEOPLE ANNOYED YOU BY CRITICIZING YOUR DRINKING: NO
EVER FELT BAD OR GUILTY ABOUT YOUR DRINKING: NO

## 2025-01-21 ASSESSMENT — PAIN DESCRIPTION - DESCRIPTORS: DESCRIPTORS: HEADACHE

## 2025-01-21 ASSESSMENT — COLUMBIA-SUICIDE SEVERITY RATING SCALE - C-SSRS
2. HAVE YOU ACTUALLY HAD ANY THOUGHTS OF KILLING YOURSELF?: NO
6. HAVE YOU EVER DONE ANYTHING, STARTED TO DO ANYTHING, OR PREPARED TO DO ANYTHING TO END YOUR LIFE?: NO
1. IN THE PAST MONTH, HAVE YOU WISHED YOU WERE DEAD OR WISHED YOU COULD GO TO SLEEP AND NOT WAKE UP?: NO

## 2025-01-21 ASSESSMENT — PAIN DESCRIPTION - LOCATION: LOCATION: HEAD

## 2025-01-21 ASSESSMENT — PAIN DESCRIPTION - PAIN TYPE: TYPE: ACUTE PAIN

## 2025-01-21 NOTE — TELEPHONE ENCOUNTER
Patient called requesting an appointment today 1/21/25 due to a fall on the ice Sunday.  Patient stated she is having headaches and is feeling woozy.    The MA was consulted.  MA stated the patient needed to go to the ER.    A return call was made and spoke to the patient.  She was informed she needed to go to the ER to be evaluated.

## 2025-01-21 NOTE — ED PROVIDER NOTES
"EMERGENCY DEPARTMENT ENCOUNTER      Pt Name: Kathe Amado  MRN: [id number]  Birthdate 1965  Date of evaluation: 1/21/2025  Provider: Devi Rice MD    CHIEF COMPLAINT       Chief Complaint   Patient presents with    Fall    Headache     Pt states she fell last Sunday she did hit her head but denies LOC and is not on thinners but pt states she has a headache that has not gone away, gets progressively worse with being on computer and trying to read, pt states nauseas and \"queasy and just don't feel good\" since the fall. Pt denies any episodes of emesis. Neuro's WNL at this time. No distress noted. No stroke like sx. Noted in triage.      HISTORY OF PRESENT ILLNESS    Kathe Amado is a 59 y.o. year old female who presents to the ER for a fall.  The patient reports that she fell forward 2 days ago and struck the front of her head.  Patient reports headache that has not gone away, she reports that it is concentrated around the area where she hit her head.  She reports that it is worse whenever she is looking at the computer screens.  The patient reports nausea in the ED but denies any episodes of vomiting.  She denies loss of consciousness or loss of bowel or bladder control.  The patient denies any visual changes.    PMH is significant for GERD, hyperlipidemia, anxiety, skin cancer s/p excision.     PAST MEDICAL HISTORY     Past Medical History:   Diagnosis Date    Allergic     Cancer (Multi)     GERD (gastroesophageal reflux disease)     HL (hearing loss)     Myalgia, other site 10/18/2021    Myofascial pain syndrome    Otalgia, right ear 08/12/2022    Referred otalgia of right ear    Personal history of other diseases of the digestive system     History of gastroesophageal reflux (GERD)    Personal history of other malignant neoplasm of skin     History of malignant neoplasm of skin    Personal history of other specified conditions     History of heartburn    Personal history of other specified " conditions 07/16/2022    History of palpitations    Urinary tract infection     Ventricular premature depolarization 07/15/2022    PVC (premature ventricular contraction)     CURRENT MEDICATIONS       Discharge Medication List as of 1/21/2025  2:19 PM        CONTINUE these medications which have NOT CHANGED    Details   ALPRAZolam (Xanax) 0.25 mg tablet Take 1 tablet (0.25 mg) by mouth as needed at bedtime for anxiety., Starting Wed 3/27/2024, Until Thu 11/14/2024 at 2359, Normal      azelastine (Astelin) 137 mcg (0.1 %) nasal spray Administer 2 sprays into each nostril 2 times a day., Starting Sun 11/5/2023, Historical Med      citalopram (CeleXA) 10 mg tablet Take 2 tablets (20 mg) by mouth once daily., Starting Mon 12/16/2024, Until Tue 12/16/2025, Normal      ibuprofen 200 mg tablet Take 3 tablets (600 mg) by mouth every 8 hours if needed for mild pain (1 - 3) or moderate pain (4 - 6)., Starting Thu 11/14/2024, Normal           SURGICAL HISTORY       Past Surgical History:   Procedure Laterality Date    OTHER SURGICAL HISTORY  02/04/2019    Tonsillectomy    TONSILLECTOMY      WISDOM TOOTH EXTRACTION       ALLERGIES     Zithromax [azithromycin]  FAMILY HISTORY     No family history on file.  SOCIAL HISTORY       Social History     Tobacco Use    Smoking status: Every Day     Current packs/day: 1.00     Average packs/day: 1 pack/day for 40.1 years (40.1 ttl pk-yrs)     Types: Cigarettes     Start date: 1985    Smokeless tobacco: Never   Substance Use Topics    Alcohol use: Not Currently     Comment: 6 drinks per year    Drug use: Not Currently     PHYSICAL EXAM  (up to 7 for level 4, 8 or more for level 5)     ED Triage Vitals [01/21/25 1125]   Temperature Heart Rate Respirations BP   36.6 °C (97.9 °F) 78 18 144/82      Pulse Ox Temp Source Heart Rate Source Patient Position   95 % Temporal Monitor Sitting      BP Location FiO2 (%)     Right arm --       Physical Exam  Constitutional:       Appearance: She is  well-developed and normal weight.   HENT:      Head: Normocephalic.      Comments: Ecchymosis over the right eye     Mouth/Throat:      Mouth: Mucous membranes are moist.   Eyes:      General: No visual field deficit or scleral icterus.     Extraocular Movements: Extraocular movements intact.      Right eye: Normal extraocular motion and no nystagmus.      Left eye: Normal extraocular motion and no nystagmus.      Pupils: Pupils are equal, round, and reactive to light. Pupils are equal.   Cardiovascular:      Rate and Rhythm: Normal rate and regular rhythm.      Heart sounds: Normal heart sounds. No murmur heard.  Pulmonary:      Effort: Pulmonary effort is normal. No respiratory distress.      Breath sounds: Normal breath sounds. No wheezing or rales.   Abdominal:      General: Bowel sounds are normal.      Palpations: Abdomen is soft.      Tenderness: There is no abdominal tenderness. There is no guarding.   Musculoskeletal:         General: No swelling or tenderness. Normal range of motion.      Cervical back: Normal range of motion and neck supple. No rigidity.   Lymphadenopathy:      Cervical: No cervical adenopathy.   Skin:     General: Skin is warm and dry.      Capillary Refill: Capillary refill takes less than 2 seconds.   Neurological:      Mental Status: She is alert and oriented to person, place, and time.      GCS: GCS eye subscore is 4. GCS verbal subscore is 5. GCS motor subscore is 6.      Cranial Nerves: No cranial nerve deficit, dysarthria or facial asymmetry.      Sensory: No sensory deficit.      Motor: No weakness.      Coordination: Coordination normal.      Gait: Gait normal.        DIAGNOSTIC RESULTS   LABS:  Labs Reviewed - No data to display  All other labs were within normal range or not returned as of this dictation.  Imaging  CT head wo IV contrast   Final Result   No acute intracranial abnormality.             MACRO:   none        Signed by: Lynda Nagel 1/21/2025 1:49 PM   Dictation  "workstation:   [id number]      CT maxillofacial bones wo IV contrast   Final Result   No acute facial bone fracture visualized.        MACRO:   None        Signed by: Lynda Nagel 1/21/2025 1:53 PM   Dictation workstation:   [id number]      CT 3D reconstruction   Final Result   No acute facial bone fracture visualized.        MACRO:   None        Signed by: Lynda Nagel 1/21/2025 1:53 PM   Dictation workstation:   [id number]         Procedure  Procedures  EMERGENCY DEPARTMENT COURSE/MDM:   Medical Decision Making    Vitals:    Vitals:    01/21/25 1125 01/21/25 1308 01/21/25 1415   BP: 144/82 105/59 123/67   BP Location: Right arm Left arm Right arm   Patient Position: Sitting Sitting Sitting   Pulse: 78 75 86   Resp: 18 16 15   Temp: 36.6 °C (97.9 °F)  36.5 °C (97.7 °F)   TempSrc: Temporal  Temporal   SpO2: 95% 100% 99%   Weight: 65.8 kg (145 lb)     Height: 1.651 m (5' 5\")       Kathe Amado is a female 59 y.o. who presents to the ER for a fall. On arrival the patients vital signs were: Afebrile, regular heart rate, normotensive, regular respiration rate, normoxic on room air. History obtained from: patient.     Differential diagnoses include concussion, cervical spine injury, intracranial bleeds, stroke.    Physical exam is significant for ecchymosis over the right eye.  There is tenderness to palpation to the upper edge of the orbital bone and the maxilla.  There is no injury to the R eye.  There is no abnormal eye movements, no nystagmus, no pain with eye motion.  Eyes are PERRL, no photophobia.  Cranial nerves II through XII are intact.  Station is intact, coordination is intact.  The patient is able to ambulate without any problems.  There is no midline tenderness to the cervical, thoracic, lumbar spine.  Patient is able to turn her head 45 degrees without any pain reported.  There is reproducible tenderness to the upper trapezius muscle bilaterally.    CT maxillofacial bones did not show any acute " facial bone fractures or dislocations.  CT of the head did not show any intracranial hemorrhage, or stroke acute fractures.    The patient was given Tylenol for her headache.  On reassessment the patient feels much better, no headache or dizziness.    The patient diagnosed with a concussion secondary to her head injury given her symptoms and negative imaging. The patient was discharged and given return precautions. The patient was instructed to follow up with the concussion specialist and with their PCP in one week. The patient understood and was agreeable with the plan.       Diagnoses as of 01/21/25 2227   Closed head injury, initial encounter   Concussion without loss of consciousness, initial encounter       Diagnostic testing considered but not performed: CT of the neck was considered but the patient was cleared by the [place] spine rules and by [facility] criteria.  External Records Reviewed: I reviewed recent and relevant outside records including inpatient notes, outpatient records      Shared decision making for disposition  Patient and/or patient´s representative was counseled regarding labs, imaging, likely diagnosis. All questions were answered. Recommendation was made   for discharge home. The patient agreed and was discharged home in stable condition with appropriate relevant educational materials. Return precautions were provided which included worsening headache, vomiting, fever of 38C (100.4) or higher, vision changes, confusion, loss of motion or feeling in your arms or legs, loss of control of your urine or stool, neck pain, fainting, seizure, or any new or worsening symptoms. .     ED Medications administered this visit:    Medications   acetaminophen (Tylenol) tablet 650 mg (650 mg oral Given 1/21/25 1303)       New Prescriptions from this visit:    Discharge Medication List as of 1/21/2025  2:19 PM          Follow-up:  Nando Olivas DO  [id number] [facility]  [address]  [facility]  [id number] [phone]    Schedule an appointment as soon as possible for a visit           Final Impression:   1. Closed head injury, initial encounter    2. Concussion without loss of consciousness, initial encounter          Please excuse any misspellings or unintended errors related to the Dragon speech recognition software used to dictate this note.    I reviewed the case with the attending ED physician. The attending ED physician agrees with the plan.      Devi Rice MD  Resident  01/21/25 [id number]

## 2025-01-21 NOTE — DISCHARGE INSTRUCTIONS
Follow-up with your primary care physician and with concussion management.  Avoid further head trauma.  Practice brain rest.  Seek immediate medical attention with any worsening symptoms, chest pain, shortness of breath, numbness, tingling, visual changes or for any reason

## 2025-03-03 ENCOUNTER — APPOINTMENT (OUTPATIENT)
Dept: PRIMARY CARE | Facility: CLINIC | Age: 60
End: 2025-03-03
Payer: COMMERCIAL

## 2025-03-03 VITALS
SYSTOLIC BLOOD PRESSURE: 120 MMHG | HEART RATE: 67 BPM | WEIGHT: 151 LBS | RESPIRATION RATE: 16 BRPM | BODY MASS INDEX: 25.16 KG/M2 | HEIGHT: 65 IN | OXYGEN SATURATION: 95 % | DIASTOLIC BLOOD PRESSURE: 72 MMHG

## 2025-03-03 DIAGNOSIS — F41.9 ANXIETY: ICD-10-CM

## 2025-03-03 DIAGNOSIS — F41.8 MIXED ANXIETY AND DEPRESSIVE DISORDER: ICD-10-CM

## 2025-03-03 PROCEDURE — 3008F BODY MASS INDEX DOCD: CPT | Performed by: INTERNAL MEDICINE

## 2025-03-03 PROCEDURE — 99213 OFFICE O/P EST LOW 20 MIN: CPT | Performed by: INTERNAL MEDICINE

## 2025-03-03 RX ORDER — ALPRAZOLAM 0.25 MG/1
0.25 TABLET ORAL NIGHTLY PRN
Qty: 30 TABLET | Refills: 3 | Status: SHIPPED | OUTPATIENT
Start: 2025-03-03 | End: 2025-07-01

## 2025-03-03 RX ORDER — CITALOPRAM 10 MG/1
15 TABLET ORAL DAILY
Qty: 45 TABLET | Refills: 11 | Status: SHIPPED | OUTPATIENT
Start: 2025-03-03 | End: 2026-03-03

## 2025-03-03 RX ORDER — MULTIVIT WITH MINERALS/HERBS
1 TABLET ORAL DAILY
Qty: 90 TABLET | Refills: 3 | Status: SHIPPED | OUTPATIENT
Start: 2025-03-03 | End: 2026-03-03

## 2025-03-03 ASSESSMENT — PAIN SCALES - GENERAL: PAINLEVEL_OUTOF10: 0-NO PAIN

## 2025-03-03 NOTE — PROGRESS NOTES
"Subjective   Kathe Amado is a 59 y.o. female who presents for Follow-up (Anxiety ).      Patient states that she tried taking 2 of the citalopram but did not like how it made her feel so she went back to taking one a day   Patient states that she does feel that the one citalopram has been helping with her anxiety   Has been feeling \"extremely\" fatigue, and everyday feels like a struggle   Patient states that in the mornings its like she has a cold and has a cough, with sneezing like she has allergies   Patient states that she sleeps really well till midnight but after that she is tossing and turning and has trouble going back to sleep   Sleeping about 6-7 hours a night but its broken sleep   Goes to bed at 8 pm and up at 5 am   Patient never feels rested when she wakes up,   Patient  has told her that she does make a lot of noise and breathe heavy at night and that she should get a sleep study     Still gets up at night and noted this started with menopause.  Sleeps well until then.    Warm at night and has sweats occasional but not soaking sheets. Temperature fluctuates and that also wakes her up .    Tries to avoid naps during the day but occasionally falls asleep.          Review of Systems   All other systems reviewed and are negative.      Objective   /72   Pulse 67   Resp 16   Ht 1.651 m (5' 5\")   Wt 68.5 kg (151 lb)   SpO2 95%   BMI 25.13 kg/m²       Physical Exam  Constitutional:       Appearance: Normal appearance.   HENT:      Head: Normocephalic.   Eyes:      Conjunctiva/sclera: Conjunctivae normal.   Cardiovascular:      Rate and Rhythm: Normal rate and regular rhythm.      Heart sounds: Normal heart sounds.   Pulmonary:      Effort: Pulmonary effort is normal.      Breath sounds: Normal breath sounds.   Musculoskeletal:      Cervical back: Neck supple.   Skin:     General: Skin is warm and dry.   Neurological:      Mental Status: She is alert.       Assessment & Plan  Mixed " anxiety and depressive disorder  Candida presents with ongoing issues of stress, and anxiety.  She feels fatigued as well, although she sleeps well.  Likely associated with anxiety and situational depression.  Will consider sleep study if not improved.   Orders:    citalopram (CeleXA) 10 mg tablet; Take 1.5 tablets (15 mg) by mouth once daily.    vitamin B complex tablet; Take 1 tablet by mouth once daily.    Anxiety    Orders:    ALPRAZolam (Xanax) 0.25 mg tablet; Take 1 tablet (0.25 mg) by mouth as needed at bedtime for anxiety.    vitamin B complex tablet; Take 1 tablet by mouth once daily.         Nando Olivas, DO

## 2025-03-03 NOTE — ASSESSMENT & PLAN NOTE
Candida presents with ongoing issues of stress, and anxiety.  She feels fatigued as well, although she sleeps well.  Likely associated with anxiety and situational depression.  Will consider sleep study if not improved.   Orders:    citalopram (CeleXA) 10 mg tablet; Take 1.5 tablets (15 mg) by mouth once daily.    vitamin B complex tablet; Take 1 tablet by mouth once daily.

## 2025-03-17 DIAGNOSIS — F41.9 ANXIETY: ICD-10-CM

## 2025-03-17 DIAGNOSIS — F41.8 MIXED ANXIETY AND DEPRESSIVE DISORDER: ICD-10-CM

## 2025-03-17 RX ORDER — MULTIVIT WITH MINERALS/HERBS
1 TABLET ORAL DAILY
Qty: 90 TABLET | Refills: 3 | Status: SHIPPED | OUTPATIENT
Start: 2025-03-17 | End: 2026-03-17

## 2025-03-17 NOTE — TELEPHONE ENCOUNTER
[facility] pharmacy left message stating they received a script for Vitamin B complex and unfortunately something happened in their system and deleted the prescription. They are requesting a new one be sent.

## 2025-03-28 ENCOUNTER — OFFICE VISIT (OUTPATIENT)
Dept: URGENT CARE | Age: 60
End: 2025-03-28
Payer: COMMERCIAL

## 2025-03-28 VITALS
WEIGHT: 150 LBS | RESPIRATION RATE: 16 BRPM | TEMPERATURE: 98.7 F | SYSTOLIC BLOOD PRESSURE: 117 MMHG | OXYGEN SATURATION: 96 % | DIASTOLIC BLOOD PRESSURE: 69 MMHG | BODY MASS INDEX: 24.99 KG/M2 | HEIGHT: 65 IN | HEART RATE: 77 BPM

## 2025-03-28 DIAGNOSIS — J01.40 ACUTE NON-RECURRENT PANSINUSITIS: Primary | ICD-10-CM

## 2025-03-28 RX ORDER — DOXYCYCLINE HYCLATE 100 MG
100 TABLET ORAL 2 TIMES DAILY
Qty: 20 TABLET | Refills: 0 | Status: SHIPPED | OUTPATIENT
Start: 2025-03-28 | End: 2025-03-28 | Stop reason: WASHOUT

## 2025-03-28 RX ORDER — DOXYCYCLINE 100 MG/1
100 CAPSULE ORAL 2 TIMES DAILY
Qty: 20 CAPSULE | Refills: 0 | Status: SHIPPED | OUTPATIENT
Start: 2025-03-28 | End: 2025-04-07

## 2025-03-28 ASSESSMENT — PAIN SCALES - GENERAL: PAINLEVEL_OUTOF10: 6

## 2025-03-28 ASSESSMENT — PATIENT HEALTH QUESTIONNAIRE - PHQ9
SUM OF ALL RESPONSES TO PHQ9 QUESTIONS 1 AND 2: 0
1. LITTLE INTEREST OR PLEASURE IN DOING THINGS: NOT AT ALL
2. FEELING DOWN, DEPRESSED OR HOPELESS: NOT AT ALL

## 2025-03-28 NOTE — PROGRESS NOTES
Subjective   Patient ID: Kathe Amado is a 59 y.o. female. They present today with a chief complaint of Sinusitis (Congestion x 3-4 weeks, five days ago headache started along with pain in cheeks and teeth).    History of Present Illness  Subjective  Kathe Amado is a 59 y.o. female who presents for evaluation of possible sinus infection. Symptoms include achiness, congestion, coryza, facial pain, nasal congestion, post nasal drip, purulent nasal discharge, and sinus pressure with no fever, chills, night sweats or weight loss. Onset of symptoms was 4 weeks ago, gradually worsening since that time. She is drinking plenty of fluids. Past history is significant for no history of pneumonia or bronchitis. Patient is a non-smoker.    Review of Systems   Constitutional:  Endorses malaise, fatigue. Denies fever, chills.  ENT: See HPI  Respiratory:  Denies cough, sputum production, shortness of breath, wheezing.    Cardiovascular:  Denies chest pain, palpitations, syncope, lightheadedness, dizziness.    Gastrointestinal:  Denies abdominal pain, nausea, vomiting, diarrhea.    Integumentary:  Denies rash.    All other systems are negative        History provided by:  Patient   used: No        Past Medical History  Allergies as of 03/28/2025 - Reviewed 03/28/2025   Allergen Reaction Noted    Zithromax [azithromycin] Hives 03/06/2023       (Not in a hospital admission)       Past Medical History:   Diagnosis Date    Allergic     Cancer (Multi)     GERD (gastroesophageal reflux disease)     HL (hearing loss)     Myalgia, other site 10/18/2021    Myofascial pain syndrome    Otalgia, right ear 08/12/2022    Referred otalgia of right ear    Personal history of other diseases of the digestive system     History of gastroesophageal reflux (GERD)    Personal history of other malignant neoplasm of skin     History of malignant neoplasm of skin    Personal history of other specified conditions     History  "of heartburn    Personal history of other specified conditions 07/16/2022    History of palpitations    Urinary tract infection     Ventricular premature depolarization 07/15/2022    PVC (premature ventricular contraction)       Past Surgical History:   Procedure Laterality Date    OTHER SURGICAL HISTORY  02/04/2019    Tonsillectomy    TONSILLECTOMY      WISDOM TOOTH EXTRACTION          reports that she has been smoking cigarettes. She started smoking about 40 years ago. She has a 40.2 pack-year smoking history. She has never used smokeless tobacco. She reports that she does not currently use alcohol. She reports that she does not currently use drugs.    Review of Systems  Review of Systems                               Objective    Vitals:    03/28/25 1239   BP: 117/69   Pulse: 77   Resp: 16   Temp: 37.1 °C (98.7 °F)   TempSrc: Oral   SpO2: 96%   Weight: 68 kg (150 lb)   Height: 1.651 m (5' 5\")     No LMP recorded. Patient is postmenopausal.    Physical Exam  Vitals and nursing note reviewed.   Constitutional:       General: She is not in acute distress.     Appearance: Normal appearance. She is normal weight. She is ill-appearing. She is not toxic-appearing or diaphoretic.   HENT:      Right Ear: Tympanic membrane, ear canal and external ear normal. There is no impacted cerumen.      Left Ear: Tympanic membrane, ear canal and external ear normal. There is no impacted cerumen.      Nose: Congestion and rhinorrhea present.      Mouth/Throat:      Mouth: Mucous membranes are moist.      Pharynx: Oropharynx is clear. No oropharyngeal exudate or posterior oropharyngeal erythema.   Eyes:      General: No scleral icterus.        Right eye: No discharge.         Left eye: No discharge.      Conjunctiva/sclera: Conjunctivae normal.   Cardiovascular:      Rate and Rhythm: Normal rate and regular rhythm.      Pulses: Normal pulses.      Heart sounds: Normal heart sounds.   Pulmonary:      Effort: Pulmonary effort is normal. " No respiratory distress.      Breath sounds: Normal breath sounds. No stridor. No wheezing, rhonchi or rales.   Musculoskeletal:         General: Normal range of motion.      Cervical back: Normal range of motion and neck supple. No rigidity or tenderness.   Lymphadenopathy:      Cervical: No cervical adenopathy.   Skin:     General: Skin is warm and dry.      Coloration: Skin is not jaundiced or pale.      Findings: No bruising, erythema, lesion or rash.   Neurological:      General: No focal deficit present.      Mental Status: She is alert and oriented to person, place, and time.      Sensory: No sensory deficit.      Motor: No weakness.      Coordination: Coordination normal.      Gait: Gait normal.         Procedures    Point of Care Test & Imaging Results from this visit  No results found for this visit on 03/28/25.   Imaging  No results found.    Cardiology, Vascular, and Other Imaging  No other imaging results found for the past 2 days      Diagnostic study results (if any) were reviewed by [facility].    Assessment/Plan   Allergies, medications, history, and pertinent labs/EKGs/Imaging reviewed by PAULY Blas.     Medical Decision Making    ?Based on history of persistent sinus symptoms, likely bacterial sinusitis.?No evidence of Meningitis, OM, Strep or Pneumonia.?Viral swab testing negative. Will start on oral antibiotics today.? Encouraged patient to continue symptomatic and supportive care measures.? RTC with any new or worsening symptoms.? Discussed when to seek emergent care. Patient verbalized understanding and agreeable with plan.??      Orders and Diagnoses  There are no diagnoses linked to this encounter.    Medical Admin Record      Patient disposition: Home    Electronically signed by [facility]  12:43 PM

## 2025-03-28 NOTE — PATIENT INSTRUCTIONS
Take full course of antibiotics even though he may be feeling better.Take medications as directed. Take with food, yogurt, or a probiotic. I recommend taking a probiotic while taking this medication, and for 7 days after you finish it.    A probiotic is a supplement you can buy over-the-counter that helps support the good bacteria in your body while taking antibiotics. Yogurt with live and active cultures are also a good source of probiotics. They may help to avoid the side effects of antibiotics, such as diarrhea or yeast infections. It is best to take a probiotic about 2 hours after your dose of antibiotic.    If you develop a rash, itching, hives, wheezing, or difficulty swallowing, seek medical care immediately.      Antibiotics should never be saved or taken without the direction of a medical provider.  Failure to take an entire course of an antibiotic means that the infection may not be adequately treated.  It can also increase the risk for developing bacteria that are resistant, which can make treating future infections more difficult.   Avoid sick individuals when possible  Cough or sneeze into sleeve, rather than hands  Mask while in public  Frequent handwashing is the most effective preventative measure  Rest and increase clear liquid intake  Blow nose, rather than sniffing  Sleep with head of bed elevated  Use humidifier  Lozenges for throat discomfort  Acetaminophen or ibuprofen for pain/fever  Take antibiotics as prescribed  Eat before taking antibiotic to minimize stomach discomfort  Return for any new or worsening symptoms or if you fail to improve after 3-5 days

## 2025-06-04 ENCOUNTER — APPOINTMENT (OUTPATIENT)
Dept: PRIMARY CARE | Facility: CLINIC | Age: 60
End: 2025-06-04
Payer: COMMERCIAL

## 2025-06-04 VITALS
DIASTOLIC BLOOD PRESSURE: 72 MMHG | WEIGHT: 151 LBS | HEART RATE: 86 BPM | RESPIRATION RATE: 16 BRPM | OXYGEN SATURATION: 95 % | HEIGHT: 65 IN | SYSTOLIC BLOOD PRESSURE: 110 MMHG | BODY MASS INDEX: 25.16 KG/M2

## 2025-06-04 DIAGNOSIS — E55.9 VITAMIN D DEFICIENCY: ICD-10-CM

## 2025-06-04 DIAGNOSIS — H90.3 BILATERAL SENSORINEURAL HEARING LOSS: Primary | ICD-10-CM

## 2025-06-04 DIAGNOSIS — Z00.00 WELL ADULT HEALTH CHECK: ICD-10-CM

## 2025-06-04 DIAGNOSIS — F41.8 MIXED ANXIETY AND DEPRESSIVE DISORDER: ICD-10-CM

## 2025-06-04 DIAGNOSIS — E53.8 VITAMIN B 12 DEFICIENCY: ICD-10-CM

## 2025-06-04 DIAGNOSIS — Z11.4 ENCOUNTER FOR SCREENING FOR HIV: ICD-10-CM

## 2025-06-04 DIAGNOSIS — Z12.31 BREAST CANCER SCREENING BY MAMMOGRAM: ICD-10-CM

## 2025-06-04 DIAGNOSIS — F41.9 ANXIETY: ICD-10-CM

## 2025-06-04 PROCEDURE — 3008F BODY MASS INDEX DOCD: CPT | Performed by: INTERNAL MEDICINE

## 2025-06-04 PROCEDURE — 99213 OFFICE O/P EST LOW 20 MIN: CPT | Performed by: INTERNAL MEDICINE

## 2025-06-04 RX ORDER — CITALOPRAM 10 MG/1
10 TABLET ORAL DAILY
Qty: 30 TABLET | Refills: 11 | Status: SHIPPED | OUTPATIENT
Start: 2025-06-04 | End: 2026-06-04

## 2025-06-04 RX ORDER — ALPRAZOLAM 0.25 MG/1
0.25 TABLET ORAL NIGHTLY PRN
Qty: 30 TABLET | Refills: 3 | Status: SHIPPED | OUTPATIENT
Start: 2025-06-04 | End: 2025-10-02

## 2025-06-04 RX ORDER — LORATADINE 10 MG/1
10 TABLET ORAL DAILY
COMMUNITY

## 2025-06-04 ASSESSMENT — ENCOUNTER SYMPTOMS
SORE THROAT: 0
CHILLS: 0
PALPITATIONS: 0
TROUBLE SWALLOWING: 0
SHORTNESS OF BREATH: 0
ABDOMINAL PAIN: 0
FEVER: 0

## 2025-06-04 ASSESSMENT — PATIENT HEALTH QUESTIONNAIRE - PHQ9
1. LITTLE INTEREST OR PLEASURE IN DOING THINGS: NOT AT ALL
2. FEELING DOWN, DEPRESSED OR HOPELESS: NOT AT ALL
SUM OF ALL RESPONSES TO PHQ9 QUESTIONS 1 AND 2: 0

## 2025-06-04 ASSESSMENT — PAIN SCALES - GENERAL: PAINLEVEL_OUTOF10: 0-NO PAIN

## 2025-06-04 NOTE — PROGRESS NOTES
Subjective   Kathe Amado is a 59 y.o. female who presents for Follow-up (Medication- went back to taking one tablet of citalopram, reports that she is doing well with just the one tablet daily ).      History of Present Illness  The patient presents for evaluation of anxiety, allergies, and hearing loss.    Anxiety  She has been on a regimen of citalopram, reduced from 1.5 to 1 tablet, which she reports as effective in managing her anxiety. The medication has been beneficial in maintaining her calmness and preventing rapid onset of anxiety. She has been on this treatment for approximately 6 months, starting in 11/2024. Additionally, she has incorporated Jazzercise into her routine twice weekly, which she believes is also contributing positively to her mental health. She has not sought counseling services. She acknowledges that certain events, such as hospital visits, can trigger her anxiety, but overall, she feels the medication has helped stabilize her condition. She continues to experience health-related anxiety but is actively working on managing it. She has increased her use of Xanax from once to 2 to 3 times per week, taking half a tablet each time. This has improved her sleep quality, although she now experiences difficulty falling asleep initially. She attributes this to racing thoughts, particularly related to her stressful job. She attempts to fall asleep without medication but resorts to Xanax if she remains awake after an hour or more. She notes that her Jazzercise classes have improved her sleep quality and increased her heart rate variability, as measured by her Fitbit ginger.  - Onset: Approximately 6 months ago, starting in 11/2024.  - Duration: Ongoing for 6 months.  - Character: Anxiety managed with citalopram and Xanax; difficulty falling asleep due to racing thoughts.  - Alleviating/Aggravating Factors: Citalopram, Xanax, Jazzercise, stressful job, hospital visits.  - Timing: Increased use of  Xanax from once to 2 to 3 times per week; difficulty falling asleep initially.  - Severity: Improved sleep quality with Xanax; health-related anxiety managed but still present.    Allergies  She has been experiencing severe allergies this year, which she has been managing with daily Claritin. This has been effective in controlling her symptoms, although she did experience one sinus infection for which she sought treatment at  [facility]. She also uses azelastine nasal spray as needed for severe congestion. She previously tried Flonase but discontinued it due to facial numbness.  - Onset: This year.  - Duration: Ongoing.  - Character: Severe allergies managed with Claritin; one sinus infection; severe congestion managed with azelastine nasal spray.  - Alleviating/Aggravating Factors: Claritin, azelastine nasal spray, discontinued Flonase due to facial numbness.  - Severity: Severe allergies; effective management with Claritin and azelastine nasal spray.    Hearing Loss  She is seeking a referral for further evaluation of her hearing loss. She was dissatisfied with the responses she received from the ENT specialist and the subsequent hearing specialist she was referred to. She finds it difficult to believe that there is no hearing aid available that could assist her. She has been advised to inform others of her hearing impairment and to position herself with her back to the crowd in noisy environments. She has undergone a hearing test and was informed that she has lost low-frequency sounds, unlike most people her age who lose high-pitch frequency sounds. She was told that there is no hearing aid for her specific type of hearing loss. She has developed lip-reading skills due to her hearing impairment. She finds it frustrating that she can engage in a full conversation with someone and still be unable to understand what was said. She was asked about her chocolate consumption and coffee intake during her consultation  "with the hearing specialist. She feels that her hearing has deteriorated since her last visit.  - Onset: Not specified.  - Duration: Not specified.  - Character: Loss of low-frequency sounds; difficulty understanding conversations; developed lip-reading skills.  - Alleviating/Aggravating Factors: Advised to inform others of hearing impairment and position herself with her back to the crowd in noisy environments; dissatisfaction with ENT and hearing specialist responses.  - Severity: Frustrating inability to understand conversations; hearing deterioration since last visit.      Review of Systems   Constitutional:  Negative for chills and fever.   HENT:  Negative for sore throat and trouble swallowing.    Respiratory:  Negative for shortness of breath.    Cardiovascular:  Negative for chest pain, palpitations and leg swelling.   Gastrointestinal:  Negative for abdominal pain.   Skin:  Negative for rash.       Objective   /72   Pulse 86   Resp 16   Ht 1.651 m (5' 5\")   Wt 68.5 kg (151 lb)   SpO2 95%   BMI 25.13 kg/m²       Physical Exam  Constitutional:       Appearance: Normal appearance.   HENT:      Head: Normocephalic.   Eyes:      Conjunctiva/sclera: Conjunctivae normal.   Cardiovascular:      Rate and Rhythm: Normal rate and regular rhythm.      Heart sounds: Normal heart sounds.   Pulmonary:      Effort: Pulmonary effort is normal.      Breath sounds: Normal breath sounds.   Musculoskeletal:      Cervical back: Neck supple.   Skin:     General: Skin is warm and dry.   Neurological:      Mental Status: She is alert.         Assessment & Plan  Anxiety    Orders:    ALPRAZolam (Xanax) 0.25 mg tablet; Take 1 tablet (0.25 mg) by mouth as needed at bedtime for anxiety.    Mixed anxiety and depressive disorder    Orders:    citalopram (CeleXA) 10 mg tablet; Take 1 tablet (10 mg) by mouth once daily.    Bilateral sensorineural hearing loss    Orders:    Referral to Audiology; [facility] adult [facility] " check    Orders:    Lipid Panel; Future    TSH with reflex to Free T4 if abnormal; Future    Comprehensive Metabolic Panel; Future    CBC; Future    Hepatitis C Antibody; Future    Breast cancer screening by mammogram    Orders:    BI mammo bilateral screening tomosynthesis; Future    Encounter for screening for HIV    Orders:    HIV 1/2 Antigen/Antibody Screen with Reflex to Confirmation; Future    Vitamin B 12 deficiency    Orders:    Vitamin B12; Future    Vitamin D deficiency    Orders:    Vitamin D 25-Hydroxy,Total (for eval of Vitamin D levels); Future       Assessment & Plan  1. Anxiety: Stable.  - Reducing citalopram to 1 tablet daily has been effective in managing anxiety since 03/2025.  - Engages in Jazzercise twice a week, which helps with anxiety and sleep.  - Uses Xanax 0.25 mg, taking half a tablet 2-3 times a week, primarily to help with sleep onset.  - Continue current dosage of citalopram and Xanax as needed.    2. Allergic rhinitis.  - Severe allergies this year.  - Uses Claritin daily, which has kept symptoms under control.  - Uses azelastine nasal spray as needed for severe congestion.  - Had a sinus infection earlier this year, treated at urgent care.  - Continue current regimen of Claritin and azelastine nasal spray as needed.    3. Hearing loss.  - Dissatisfaction with previous consultations regarding hearing loss, particularly with low-frequency sounds.  - Previous hearing test in 2019 indicated low-frequency hearing loss.  - Referral to [facility] for updated audiology evaluation.    Follow-up  - Updated audiology evaluation at [facility].      Nando Olivas,    This medical note was created with the assistance of artificial intelligence (AI) for documentation purposes. The content has been reviewed and confirmed by the healthcare provider for accuracy and completeness. Patient consented to the use of audio recording and use of AI during their  visit.

## 2025-06-14 DIAGNOSIS — R73.01 ELEVATED FASTING GLUCOSE: Primary | ICD-10-CM

## 2025-06-14 DIAGNOSIS — E78.2 HYPERLIPIDEMIA, MIXED: ICD-10-CM

## 2025-06-14 DIAGNOSIS — Z13.1 SCREENING FOR DIABETES MELLITUS (DM): ICD-10-CM

## 2025-06-14 LAB
25(OH)D3+25(OH)D2 SERPL-MCNC: 27 NG/ML (ref 30–100)
ALBUMIN SERPL-MCNC: 4.6 G/DL (ref 3.6–5.1)
ALP SERPL-CCNC: 75 U/L (ref 37–153)
ALT SERPL-CCNC: 12 U/L (ref 6–29)
ANION GAP SERPL CALCULATED.4IONS-SCNC: 11 MMOL/L (CALC) (ref 7–17)
AST SERPL-CCNC: 13 U/L (ref 10–35)
BILIRUB SERPL-MCNC: 0.4 MG/DL (ref 0.2–1.2)
BUN SERPL-MCNC: 15 MG/DL (ref 7–25)
CALCIUM SERPL-MCNC: 9.2 MG/DL (ref 8.6–10.4)
CHLORIDE SERPL-SCNC: 104 MMOL/L (ref 98–110)
CHOLEST SERPL-MCNC: 274 MG/DL
CHOLEST/HDLC SERPL: 7.2 (CALC)
CO2 SERPL-SCNC: 25 MMOL/L (ref 20–32)
CREAT SERPL-MCNC: 0.65 MG/DL (ref 0.5–1.03)
EGFRCR SERPLBLD CKD-EPI 2021: 101 ML/MIN/1.73M2
ERYTHROCYTE [DISTWIDTH] IN BLOOD BY AUTOMATED COUNT: 13.9 % (ref 11–15)
GLUCOSE SERPL-MCNC: 98 MG/DL (ref 65–99)
HCT VFR BLD AUTO: 46.1 % (ref 35–45)
HCV AB SERPL QL IA: NORMAL
HDLC SERPL-MCNC: 38 MG/DL
HGB BLD-MCNC: 15 G/DL (ref 11.7–15.5)
HIV 1+2 AB+HIV1 P24 AG SERPL QL IA: NORMAL
LDLC SERPL CALC-MCNC: 178 MG/DL (CALC)
MCH RBC QN AUTO: 29.3 PG (ref 27–33)
MCHC RBC AUTO-ENTMCNC: 32.5 G/DL (ref 32–36)
MCV RBC AUTO: 90 FL (ref 80–100)
NONHDLC SERPL-MCNC: 236 MG/DL (CALC)
PLATELET # BLD AUTO: 282 THOUSAND/UL (ref 140–400)
PMV BLD REES-ECKER: 10 FL (ref 7.5–12.5)
POTASSIUM SERPL-SCNC: 4.3 MMOL/L (ref 3.5–5.3)
PROT SERPL-MCNC: 6.8 G/DL (ref 6.1–8.1)
RBC # BLD AUTO: 5.12 MILLION/UL (ref 3.8–5.1)
SODIUM SERPL-SCNC: 140 MMOL/L (ref 135–146)
TRIGL SERPL-MCNC: 345 MG/DL
TSH SERPL-ACNC: 1.29 MIU/L (ref 0.4–4.5)
VIT B12 SERPL-MCNC: 353 PG/ML (ref 200–1100)
WBC # BLD AUTO: 8.1 THOUSAND/UL (ref 3.8–10.8)

## 2025-07-07 ENCOUNTER — APPOINTMENT (OUTPATIENT)
Dept: RADIOLOGY | Facility: CLINIC | Age: 60
End: 2025-07-07
Payer: COMMERCIAL

## 2025-07-07 VITALS — WEIGHT: 151 LBS | HEIGHT: 65 IN | BODY MASS INDEX: 25.16 KG/M2

## 2025-07-07 DIAGNOSIS — Z12.31 BREAST CANCER SCREENING BY MAMMOGRAM: ICD-10-CM

## 2025-07-07 PROCEDURE — 77063 BREAST TOMOSYNTHESIS BI: CPT

## 2025-07-07 PROCEDURE — 77067 SCR MAMMO BI INCL CAD: CPT | Performed by: RADIOLOGY

## 2025-07-07 PROCEDURE — 77063 BREAST TOMOSYNTHESIS BI: CPT | Performed by: RADIOLOGY

## 2025-08-19 ENCOUNTER — APPOINTMENT (OUTPATIENT)
Dept: PRIMARY CARE | Facility: CLINIC | Age: 60
End: 2025-08-19
Payer: COMMERCIAL

## 2025-08-19 VITALS
DIASTOLIC BLOOD PRESSURE: 70 MMHG | HEART RATE: 72 BPM | WEIGHT: 154 LBS | OXYGEN SATURATION: 97 % | RESPIRATION RATE: 16 BRPM | HEIGHT: 65 IN | BODY MASS INDEX: 25.66 KG/M2 | SYSTOLIC BLOOD PRESSURE: 112 MMHG

## 2025-08-19 DIAGNOSIS — E53.8 VITAMIN B 12 DEFICIENCY: ICD-10-CM

## 2025-08-19 DIAGNOSIS — E78.5 DYSLIPIDEMIA: ICD-10-CM

## 2025-08-19 DIAGNOSIS — E78.2 HYPERLIPIDEMIA, MIXED: ICD-10-CM

## 2025-08-19 DIAGNOSIS — F17.200 CURRENT EVERY DAY SMOKER: Primary | ICD-10-CM

## 2025-08-19 DIAGNOSIS — F41.8 MIXED ANXIETY AND DEPRESSIVE DISORDER: ICD-10-CM

## 2025-08-19 DIAGNOSIS — E78.1 HYPERTRIGLYCERIDEMIA: ICD-10-CM

## 2025-08-19 DIAGNOSIS — E66.3 OVERWEIGHT (BMI 25.0-29.9): ICD-10-CM

## 2025-08-19 PROCEDURE — 3008F BODY MASS INDEX DOCD: CPT | Performed by: INTERNAL MEDICINE

## 2025-08-19 PROCEDURE — 99214 OFFICE O/P EST MOD 30 MIN: CPT | Performed by: INTERNAL MEDICINE

## 2025-08-19 RX ORDER — CITALOPRAM 10 MG/1
20 TABLET ORAL DAILY
Qty: 60 TABLET | Refills: 11 | Status: SHIPPED | OUTPATIENT
Start: 2025-08-19 | End: 2026-08-19

## 2025-08-19 ASSESSMENT — ENCOUNTER SYMPTOMS
FEVER: 0
SORE THROAT: 0
SHORTNESS OF BREATH: 0
TROUBLE SWALLOWING: 0
ABDOMINAL PAIN: 0
CHILLS: 0
PALPITATIONS: 0

## 2025-08-19 ASSESSMENT — PATIENT HEALTH QUESTIONNAIRE - PHQ9
2. FEELING DOWN, DEPRESSED OR HOPELESS: NOT AT ALL
1. LITTLE INTEREST OR PLEASURE IN DOING THINGS: NOT AT ALL
SUM OF ALL RESPONSES TO PHQ9 QUESTIONS 1 AND 2: 0

## 2025-08-19 ASSESSMENT — PAIN SCALES - GENERAL: PAINLEVEL_OUTOF10: 8

## 2026-01-12 ENCOUNTER — APPOINTMENT (OUTPATIENT)
Dept: PRIMARY CARE | Facility: CLINIC | Age: 61
End: 2026-01-12
Payer: COMMERCIAL